# Patient Record
Sex: FEMALE | Race: WHITE | NOT HISPANIC OR LATINO | Employment: UNEMPLOYED | URBAN - METROPOLITAN AREA
[De-identification: names, ages, dates, MRNs, and addresses within clinical notes are randomized per-mention and may not be internally consistent; named-entity substitution may affect disease eponyms.]

---

## 2017-01-19 ENCOUNTER — ALLSCRIPTS OFFICE VISIT (OUTPATIENT)
Dept: OTHER | Facility: OTHER | Age: 42
End: 2017-01-19

## 2017-01-19 DIAGNOSIS — E55.9 VITAMIN D DEFICIENCY: ICD-10-CM

## 2017-01-19 DIAGNOSIS — R07.89 OTHER CHEST PAIN: ICD-10-CM

## 2017-01-19 DIAGNOSIS — D72.829 ELEVATED WHITE BLOOD CELL COUNT: ICD-10-CM

## 2017-01-19 DIAGNOSIS — D56.3 THALASSEMIA MINOR: ICD-10-CM

## 2017-01-19 DIAGNOSIS — Z13.89 ENCOUNTER FOR SCREENING FOR OTHER DISORDER: ICD-10-CM

## 2017-01-19 DIAGNOSIS — D64.9 ANEMIA: ICD-10-CM

## 2017-08-07 ENCOUNTER — ALLSCRIPTS OFFICE VISIT (OUTPATIENT)
Dept: OTHER | Facility: OTHER | Age: 42
End: 2017-08-07

## 2017-08-07 DIAGNOSIS — D56.3 THALASSEMIA MINOR: ICD-10-CM

## 2018-01-10 NOTE — PROGRESS NOTES
Assessment    1  Annual physical exam (V70 0) (Z00 00)    Plan  Alpha thalassemia minor    · (1) COMPREHENSIVE METABOLIC PANEL; Status:Active; Requested [id number]; Anemia    · (1) CBC/PLT/DIFF; Status:Active; Requested for:19Jan2017;    · (1) TSH WITH FT4 REFLEX; Status:Active; Requested [id number];   Encounter for special screening examination for genitourinary disorder    · (1) URINALYSIS (will reflex a microscopy if leukocytes, occult blood, protein or nitrites  are not within normal limits); Status:Active; Requested [id number];   Pressure in left side of chest    · (1) LIPID PANEL FASTING W DIRECT LDL REFLEX; Status:Active; Requested  [id number]:[id number];   Vitamin D deficiency    · (1) VITAMIN D 25-HYDROXY; Status:Active; Requested [id number]:[id number];     Discussion/Summary  healthy adult female cervical cancer screening is current Breast cancer screening: mammogram is current and breast cancer screening is managed by roz  Colorectal cancer screening: colorectal cancer screening is current  Screening lab work includes see orders  The immunizations are up to date  Advice and education were given regarding calcium supplements and vitamin D supplements  Chief Complaint  ANNUAL PE      History of Present Illness  HM, Adult Female: The patient is being seen for a health maintenance evaluation  General Health: The patient's health since the last visit is described as good  She has regular dental visits  She denies vision problems  She denies hearing loss  Lifestyle:  She consumes a diverse and healthy diet  She does not have any weight concerns  She does not use tobacco  She denies drug use  Reproductive health: the patient is premenopausal    Screening: cancer screening reviewed and current  Review of Systems    Constitutional: no fever, not feeling poorly, no chills and not feeling tired  Eyes: no eyesight problems     ENT: globus sensation resolved, but no earache, no nosebleeds and no sore throat  Cardiovascular: no chest pain, no palpitations and no lower extremity edema  Respiratory: no cough, no shortness of breath during exertion and no PND  Gastrointestinal: no abdominal pain, no nausea and no diarrhea  Genitourinary: no dysuria, no pelvic pain and no incontinence  Musculoskeletal: no arthralgias  Integumentary: no rashes  Neurological: no headache, no numbness, no dizziness and no difficulty walking  Psychiatric: anxiety and in under control now, but no sleep disturbances and no depression  Hematologic/Lymphatic: no tendency for easy bleeding  Active Problems    1  Abdominal pain (789 00) (R10 9)   2  Alpha thalassemia minor (282 46) (D56 3)   3  Anemia (285 9) (D64 9)   4  Annual physical exam (V70 0) (Z00 00)   5  Chronic fatigue (780 79) (R53 82)   6  Depression (311) (F32 9)   7  Diarrhea (787 91) (R19 7)   8  Dizziness (780 4) (R42)   9  Dyspnea (786 09) (R06 00)   10  Encounter for special screening examination for genitourinary disorder (V81 6) (Z13 89)   11  Globus sensation (306 4) (F45 8)   12  Laboratory examination ordered as part of a routine general medical examination    (V72 62) (Z00 00)   13  Nausea (787 02) (R11 0)   14  Palpitations (785 1) (R00 2)   15  Pressure in left side of chest (786 59) (R07 89)   16  Screening for cholesterol level (V77 91) (Z13 220)   17  Screening for diabetes mellitus (V77 1) (Z13 1)   18  Screening for thyroid disorder (V77 0) (Z13 29)   19  Trouble swallowing (787 20) (R13 10)   20   Vitamin D deficiency (268 9) (E55 9)    Past Medical History    · History of diarrhea (V12 79) (Z87 898)   · History of esophageal reflux (V12 79) (Z87 19)   · History of thalassemia (V12 3) (Z86 2)   · History of Hypochromic/Microcytic Thalassemia (282 49)    Surgical History    · History of  Section   · History of Colonoscopy    Family History  Mother    · Family history of Diabetes mellitus   · Family history of malignant neoplasm of thyroid (V16 8) (Z80 8)  Father    · Family history of Kidney cancer, primary, with metastasis from kidney to other site   · Family history of Stroke  Family History    · Family history of Diabetes Mellitus (V18 0)   · Family history of Kidney Cancer (V16 51)    Social History    · Denied: History of Alcohol Use (History)   · Denied: History of Drug Use   · Never A Smoker    Current Meds   1  Multivitamin TABS; TAKE 1 TABLET DAILY; Therapy: (Recorded:10Aug2016) to Recorded    Allergies    1  Famotidine TABS   2  Zoloft TABS    Vitals   Recorded: [id number] 09:26AM   Heart Rate 62   Systolic 94   Diastolic 68   Height 5 ft 3 in   Weight 112 lb    BMI Calculated 19 84   BSA Calculated 1 52   O2 Saturation 98     Physical Exam    Constitutional   General appearance: No acute distress, well appearing and well nourished  Head and Face   Head and face: Normal     Palpation of the face and sinuses: No sinus tenderness  Eyes   Conjunctiva and lids: No swelling, erythema or discharge  Ears, Nose, Mouth, and Throat   Otoscopic examination: Tympanic membranes translucent with normal light reflex  Canals patent without erythema  Oropharynx: Normal with no erythema, edema, exudate or lesions  Neck   Neck: Supple, symmetric, trachea midline, no masses  Thyroid: Normal, no thyromegaly  Pulmonary   Respiratory effort: No increased work of breathing or signs of respiratory distress  Auscultation of lungs: Clear to auscultation  Cardiovascular   Auscultation of heart: Normal rate and rhythm, normal S1 and S2, no murmurs  Carotid pulses: 2+ bilaterally  Abdominal aorta: Normal     Pedal pulses: 2+ bilaterally  Examination of extremities for edema and/or varicosities: Normal     Chest GYN deferred  Abdomen   Abdomen: Non-tender, no masses  Liver and spleen: No hepatomegaly or splenomegaly  Genitourinary GYN defer  Lymphatic   Palpation of lymph nodes in neck: No lymphadenopathy  Palpation of lymph nodes in axillae: No lymphadenopathy  Musculoskeletal   Gait and station: Normal     Digits and nails: Normal without clubbing or cyanosis  Joints, bones, and muscles: Normal     Range of motion: Normal     Stability: Normal     Muscle strength/tone: Normal     Skin Follows dermatology  Neurologic   Cranial nerves: Cranial nerves II-XII intact  Cortical function: Normal mental status  Reflexes: 2+ and symmetric  Sensation: No sensory loss  Coordination: Normal finger to nose and heel to shin  Psychiatric   Judgment and insight: Normal     Orientation to person, place, and time: Normal     Recent and remote memory: Intact  Mood and affect: Abnormal   Anxious, tearful, but not suicidal       Results/Data  PHQ-9 Adult Depression Screening [id number] 10:09AM User, s     Test Name Result Flag Reference   PHQ-9 Adult Depression Score 3     Over the last two weeks, how often have you been bothered by any of the following problems? Little interest or pleasure in doing things: Several days - 1  Feeling down, depressed, or hopeless: Not at all - 0  Trouble falling or staying asleep, or sleeping too much: Not at all - 0  Feeling tired or having little energy: Several days - 1  Poor appetite or over eating: Not at all - 0  Feeling bad about yourself - or that you are a failure or have let yourself or your family down: Not at all - 0  Trouble concentrating on things, such as reading the newspaper or watching television: Several days - 1  Moving or speaking so slowly that other people could have noticed   Or the opposite -  being so fidgety or restless that you have been moving around a lot more than usual: Not at all - 0  Thoughts that you would be better off dead, or of hurting yourself in some way: Not at all - 0   PHQ-9 Adult Depression Screening Negative     PHQ-9 Difficulty Level Not difficult at all     PHQ-9 Severity Minimal Depression         Future Appointments    Date/Time Provider Specialty Site   01/23/2018 09:20 AM CALDERON Capps   Internal Medicine [facility] IM     Signatures   Electronically signed by : CALDERON Quezada ; Jan 19 2017 10:54AM EST                       (Author)

## 2018-01-14 VITALS
DIASTOLIC BLOOD PRESSURE: 68 MMHG | HEART RATE: 62 BPM | WEIGHT: 112 LBS | SYSTOLIC BLOOD PRESSURE: 94 MMHG | OXYGEN SATURATION: 98 % | BODY MASS INDEX: 19.84 KG/M2 | HEIGHT: 63 IN

## 2018-01-14 VITALS
SYSTOLIC BLOOD PRESSURE: 110 MMHG | HEART RATE: 65 BPM | WEIGHT: 113.13 LBS | HEIGHT: 63 IN | OXYGEN SATURATION: 98 % | DIASTOLIC BLOOD PRESSURE: 66 MMHG | BODY MASS INDEX: 20.04 KG/M2

## 2018-01-23 ENCOUNTER — ALLSCRIPTS OFFICE VISIT (OUTPATIENT)
Dept: OTHER | Facility: OTHER | Age: 43
End: 2018-01-23

## 2018-01-23 DIAGNOSIS — R53.82 CHRONIC FATIGUE: ICD-10-CM

## 2018-01-23 DIAGNOSIS — Z13.29 ENCOUNTER FOR SCREENING FOR OTHER SUSPECTED ENDOCRINE DISORDER: ICD-10-CM

## 2018-01-23 DIAGNOSIS — E55.9 VITAMIN D DEFICIENCY: ICD-10-CM

## 2018-01-23 DIAGNOSIS — F32.9 MAJOR DEPRESSIVE DISORDER, SINGLE EPISODE: ICD-10-CM

## 2018-01-23 DIAGNOSIS — D56.3 THALASSEMIA MINOR: ICD-10-CM

## 2018-01-23 DIAGNOSIS — Z13.220 ENCOUNTER FOR SCREENING FOR LIPOID DISORDERS: ICD-10-CM

## 2018-01-23 DIAGNOSIS — Z87.19 PERSONAL HISTORY OF OTHER DISEASES OF THE DIGESTIVE SYSTEM (CODE): ICD-10-CM

## 2018-01-24 NOTE — PROGRESS NOTES
Assessment    1  Vitamin D deficiency (268 9) (E55 9)   2  Screening for cholesterol level (V77 91) (Z13 220)   3  Special screening examination for diabetes mellitus (V77 1) (Z13 1)   4  Screening for thyroid disorder (V77 0) (Z13 29)   5  Annual physical exam (V70 0) (Z00 00)   6  Alpha thalassemia minor (282 46) (D56 3)    Plan  Alpha thalassemia minor, Chronic fatigue, PMH: History of depression, PMH: History of  dysphagia    · (1) CBC/PLT/DIFF; Status:Active; Requested [id number]:[id number];   PMH: History of dysphagia    · (1) COMPREHENSIVE METABOLIC PANEL; Status:Active; Requested [id number]:[id number];    · (1) URINALYSIS (will reflex a microscopy if leukocytes, occult blood, protein or nitrites  are not within normal limits); Status:Active; Requested [id number];   PMH: History of dysphagia, Vitamin D deficiency    · (1) VITAMIN D 25-HYDROXY; Status:Active; Requested [id number]:[id number];   Screening for cholesterol level, Screening for thyroid disorder    · (1) LIPID PANEL FASTING W DIRECT LDL REFLEX; Status:Active; Requested  MCE:[id number];    · (1) TSH WITH FT4 REFLEX; Status:Active; Requested [id number]; Discussion/Summary  healthy adult female cervical cancer screening is needed every three years PER GYN Breast cancer screening: PER GYN  Colorectal cancer screening: colorectal cancer screening is current  Osteoporosis screening: bone mineral density testing is not indicated  Screening lab work includes hemoglobin, glucose, lipid profile, thyroid function testing, 25-hydroxyvitamin D and urinalysis  The patient declines immunizations  Advice and education were given regarding calcium supplements and vitamin D supplements  Patient discussion: discussed with the patient, ent REFERAL FOR CH EAR PRESSURE  Chief Complaint  ANNUAL PE      History of Present Illness  HM, Adult Female: The patient is being seen for a health maintenance evaluation  General Health:  The patient's health since the last visit is described as good  She has regular dental visits  She denies vision problems  She denies hearing loss  Lifestyle:  She consumes a diverse and healthy diet  She does not have any weight concerns  She does not use tobacco  She denies drug use  Reproductive health:  she reports normal menses  she is sexually active  Screening:      Review of Systems    Constitutional: feeling tired, but no fever, not feeling poorly, no recent weight gain, no chills and no recent weight loss  Eyes: no eyesight problems and no dryness of the eyes  ENT: earache, nasal discharge and PND, but no nosebleeds, no sore throat and no hoarseness  Cardiovascular: no chest pain, no palpitations and no lower extremity edema  Respiratory: no shortness of breath, no cough and no shortness of breath during exertion  Gastrointestinal: no abdominal pain, no nausea, no diarrhea and no blood in stools  Genitourinary: no dysuria  Musculoskeletal: no arthralgias and no joint swelling  Neurological: no numbness, no tingling and no dizziness  Psychiatric: no anxiety and no depression  Endocrine: no muscle weakness and no deepening of the voice  Hematologic/Lymphatic: no tendency for easy bleeding  Active Problems    1  Alpha thalassemia minor (282 46) (D56 3)   2  Annual physical exam (V70 0) (Z00 00)   3  Chronic fatigue (780 79) (R53 82)   4  Encounter for special screening examination for genitourinary disorder (V81 6) (Z13 89)   5  Laboratory examination ordered as part of a routine general medical examination   (V72 62) (Z00 00)   6  Screening for cholesterol level (V77 91) (Z13 220)   7  Screening for thyroid disorder (V77 0) (Z13 29)   8  Special screening examination for diabetes mellitus (V77 1) (Z13 1)   9   Vitamin D deficiency (268 9) (E55 9)    Past Medical History    · History of chronic fatigue (V13 89) (J07 463)   · History of depression (V11 8) (Z86 59)   · History of diarrhea (V12 79) (L87 791)   · History of esophageal reflux (V12 79) (Z87 19)   · History of leukocytosis (V12 3) (Z86 2)   · History of palpitations (V12 59) (Z87 898)   · History of thalassemia (V12 3) (Z86 2)   · History of Hypochromic/Microcytic Thalassemia (282 49)   · History of Pressure in left side of chest (786 59) (R07 89)    Surgical History    · History of  Section   · History of Colonoscopy    Family History  Mother    · Family history of Diabetes mellitus   · Family history of malignant neoplasm of thyroid (V16 8) (Z80 8)  Father    · Family history of Kidney cancer, primary, with metastasis from kidney to other site   · Family history of Stroke  Family History    · Family history of Diabetes Mellitus (V18 0)   · Family history of Kidney Cancer (V16 51)    Social History    · Denied: History of Alcohol Use (History)   · Denied: History of Drug Use   · Never A Smoker    Current Meds   1  Multivitamin TABS; TAKE 1 TABLET DAILY; Therapy: (Recorded:[id number]) to Recorded    Allergies    1  Famotidine TABS   2  Zoloft TABS    Vitals   Recorded: [id number] 09:20AM   Heart Rate 73   Systolic 96   Diastolic 68   Height 5 ft 3 in   Weight 113 lb    BMI Calculated 20 02   BSA Calculated 1 52   O2 Saturation 98     Physical Exam    Constitutional   General appearance: No acute distress, well appearing and well nourished  Head and Face   Head and face: Normal     Palpation of the face and sinuses: No sinus tenderness  Eyes   Conjunctiva and lids: No swelling, erythema or discharge  Ears, Nose, Mouth, and Throat   External inspection of ears and nose: Normal     Otoscopic examination: Tympanic membranes translucent with normal light reflex  Canals patent without erythema  Hearing: Normal     Oropharynx: Normal with no erythema, edema, exudate or lesions  Neck   Neck: Supple, symmetric, trachea midline, no masses  Thyroid: Normal, no thyromegaly      Pulmonary   Respiratory effort: No increased work of breathing or signs of respiratory distress  Auscultation of lungs: Clear to auscultation  Cardiovascular   Auscultation of heart: Normal rate and rhythm, normal S1 and S2, no murmurs  Carotid pulses: 2+ bilaterally  Examination of extremities for edema and/or varicosities: Normal     Abdomen   Abdomen: Non-tender, no masses  Liver and spleen: No hepatomegaly or splenomegaly  Lymphatic   Palpation of lymph nodes in neck: No lymphadenopathy  Palpation of lymph nodes in axillae: No lymphadenopathy  Musculoskeletal   Gait and station: Normal     Digits and nails: Normal without clubbing or cyanosis  Neurologic   Cranial nerves: Cranial nerves II-XII intact  Cortical function: Normal mental status  Coordination: Normal finger to nose and heel to shin  Psychiatric   Judgment and insight: Normal     Orientation to person, place, and time: Normal     Recent and remote memory: Intact  Mood and affect: Normal        Results/Data  PHQ-9 Adult Depression Screening 23Jan2018 10:21AM User, [facility]     Test Name Result Flag Reference   PHQ-9 Adult Depression Score 4     Over the last two weeks, how often have you been bothered by any of the following problems? Little interest or pleasure in doing things: Several days - 1  Feeling down, depressed, or hopeless: Several days - 1  Trouble falling or staying asleep, or sleeping too much: Not at all - 0  Feeling tired or having little energy: Several days - 1  Poor appetite or over eating: Not at all - 0  Feeling bad about yourself - or that you are a failure or have let yourself or your family down: Not at all - 0  Trouble concentrating on things, such as reading the newspaper or watching television: Several days - 1  Moving or speaking so slowly that other people could have noticed   Or the opposite -  being so fidgety or restless that you have been moving around a lot more than usual: Not at all - 0  Thoughts that you would be better off dead, or of hurting yourself in some way: Not at all - 0   PHQ-9 Adult Depression Screening Negative     PHQ-9 Difficulty Level Somewhat difficult     PHQ-9 Severity Minimal Depression         Signatures   Electronically signed by : Kal [place], ; Jan 23 2018  6:28PM EST                       (Author)    Electronically signed by : CALDERON Benitez ; Jan 24 2018  3:17PM EST                       (Author)

## 2018-02-09 ENCOUNTER — TELEPHONE (OUTPATIENT)
Dept: INTERNAL MEDICINE CLINIC | Facility: CLINIC | Age: 43
End: 2018-02-09

## 2018-02-12 NOTE — TELEPHONE ENCOUNTER
I just placed a copy in your folder  Pt would like to know when she should do a repeat u/s of thyroid

## 2019-01-24 ENCOUNTER — OFFICE VISIT (OUTPATIENT)
Dept: FAMILY MEDICINE CLINIC | Facility: CLINIC | Age: 44
End: 2019-01-24
Payer: COMMERCIAL

## 2019-01-24 VITALS
BODY MASS INDEX: 21.53 KG/M2 | DIASTOLIC BLOOD PRESSURE: 66 MMHG | OXYGEN SATURATION: 99 % | TEMPERATURE: 99.1 F | HEIGHT: 62 IN | SYSTOLIC BLOOD PRESSURE: 110 MMHG | HEART RATE: 60 BPM | WEIGHT: 117 LBS

## 2019-01-24 DIAGNOSIS — Z00.00 ANNUAL PHYSICAL EXAM: ICD-10-CM

## 2019-01-24 DIAGNOSIS — Z13.89 SCREENING FOR GENITOURINARY CONDITION: ICD-10-CM

## 2019-01-24 DIAGNOSIS — Z23 NEED FOR INFLUENZA VACCINATION: Primary | ICD-10-CM

## 2019-01-24 DIAGNOSIS — Z23 NEED FOR PNEUMOCOCCAL VACCINE: ICD-10-CM

## 2019-01-24 DIAGNOSIS — Z13.1 SCREENING FOR DIABETES MELLITUS: ICD-10-CM

## 2019-01-24 DIAGNOSIS — N92.6 MENSTRUAL IRREGULARITY: ICD-10-CM

## 2019-01-24 DIAGNOSIS — Z13.220 SCREENING FOR LIPID DISORDERS: ICD-10-CM

## 2019-01-24 DIAGNOSIS — Z80.8 FAMILY HISTORY OF THYROID CANCER: ICD-10-CM

## 2019-01-24 DIAGNOSIS — Z13.21 ENCOUNTER FOR VITAMIN DEFICIENCY SCREENING: ICD-10-CM

## 2019-01-24 DIAGNOSIS — Z13.29 SCREENING FOR THYROID DISORDER: ICD-10-CM

## 2019-01-24 DIAGNOSIS — Z23 NEED FOR TDAP VACCINATION: ICD-10-CM

## 2019-01-24 PROCEDURE — 99396 PREV VISIT EST AGE 40-64: CPT | Performed by: INTERNAL MEDICINE

## 2019-01-24 NOTE — PROGRESS NOTES
Assessment/Plan:         Diagnoses and all orders for this visit:    Need for influenza vaccination  -     Cancel: PREFERRED: influenza vaccine, [id number], quadrivalent, recombinant, PF, 0 5 mL (FLUBLOK)    Annual physical exam    Need for pneumococcal vaccine  -     Cancel: PNEUMOCOCCAL POLYSACCHARIDE VACCINE 23-VALENT =>1YO SQ IM    Need for Tdap vaccination  -     Cancel: TDAP VACCINE GREATER THAN OR EQUAL TO 8YO IM    Screening for lipid disorders    Screening for thyroid disorder  -     CBC and differential  -     Comprehensive metabolic panel  -     TSH, 3rd generation with Free T4 reflex    Screening for diabetes mellitus  -     Lipid panel    Encounter for vitamin deficiency screening  -     Vitamin D 25 hydroxy    Screening for genitourinary condition  -     Urinalysis with microscopic    Menstrual irregularity  -     Insulin, random  -     Testosterone, Free and Weakly Bound  -     DHEA-sulfate  -     17-Hydroxyprogesterone    Family history of thyroid cancer  -     US thyroid; Future        Subjective:      Patient ID: Den Ambrose is a 44 y o  female  Patient is here for annual physical   She is had been following up regularly with a gynecologist   Has a mammogram coming up soon  She has been having menstrual irregularity  Has been feeling somewhat begum easily irritated  She does not report any heat flashes  I advised her to follow up her gyn  She has been having some acne facial hair as well  Has not gained any weight  She is due for lab studies  See ROS        The following portions of the patient's history were reviewed and updated as appropriate: allergies, current medications, past family history, past medical history, past social history, past surgical history and problem list     Review of Systems   Constitutional: Negative for appetite change, chills, fatigue, fever and unexpected weight change     HENT: Negative for congestion, hearing loss, postnasal drip, trouble swallowing and voice change  Eyes: Negative for pain and visual disturbance  Respiratory: Negative for cough, chest tightness and shortness of breath  Cardiovascular: Negative for chest pain, palpitations and leg swelling  Gastrointestinal: Negative for abdominal pain, blood in stool, constipation, diarrhea, nausea and vomiting  Endocrine: Negative for cold intolerance, heat intolerance, polydipsia and polyphagia  Genitourinary: Positive for menstrual problem  Negative for difficulty urinating, flank pain, frequency and hematuria  Musculoskeletal: Negative for arthralgias, back pain, gait problem, joint swelling and myalgias  Skin: Negative for rash  Neurological: Negative for dizziness, weakness, light-headedness, numbness and headaches  Hematological: Negative for adenopathy  Does not bruise/bleed easily  Psychiatric/Behavioral: Negative for confusion, dysphoric mood and sleep disturbance  The patient is nervous/anxious  Objective:      /66 (BP Location: Left arm, Patient Position: Sitting, Cuff Size: Standard)   Pulse 60   Temp 99 1 °F (37 3 °C) (Tympanic)   Ht 5' 2" (1 575 m)   Wt 53 1 kg (117 lb)   SpO2 99%   BMI 21 40 kg/m²          Physical Exam   Constitutional: She is oriented to person, place, and time  She appears well-developed and well-nourished  No distress  HENT:   Head: Normocephalic  Mouth/Throat: No oropharyngeal exudate  Eyes: Pupils are equal, round, and reactive to light  No scleral icterus  Neck: No thyromegaly present  Cardiovascular: Normal rate, regular rhythm, normal heart sounds and intact distal pulses  No murmur heard  Pulmonary/Chest: Effort normal and breath sounds normal  No respiratory distress  She has no wheezes  She has no rales  Abdominal: Soft  Bowel sounds are normal  She exhibits no distension  There is no tenderness  There is no rebound  Musculoskeletal: She exhibits no edema     Lymphadenopathy:     She has no cervical adenopathy  Neurological: She is alert and oriented to person, place, and time  No cranial nerve deficit  Skin: Skin is warm  Psychiatric: She has a normal mood and affect   Her behavior is normal  Judgment and thought content normal

## 2019-02-25 ENCOUNTER — TELEPHONE (OUTPATIENT)
Dept: FAMILY MEDICINE CLINIC | Facility: CLINIC | Age: 44
End: 2019-02-25

## 2019-02-25 DIAGNOSIS — E55.9 VITAMIN D DEFICIENCY: Primary | ICD-10-CM

## 2019-02-25 RX ORDER — ERGOCALCIFEROL 1.25 MG/1
50000 CAPSULE ORAL WEEKLY
Qty: 8 CAPSULE | Refills: 0 | Status: SHIPPED | OUTPATIENT
Start: 2019-02-25 | End: 2019-10-10 | Stop reason: ALTCHOICE

## 2019-02-28 ENCOUNTER — TELEPHONE (OUTPATIENT)
Dept: FAMILY MEDICINE CLINIC | Facility: CLINIC | Age: 44
End: 2019-02-28

## 2019-03-28 ENCOUNTER — HOSPITAL ENCOUNTER (OUTPATIENT)
Dept: ULTRASOUND IMAGING | Facility: MEDICAL CENTER | Age: 44
Discharge: HOME | End: 2019-03-28
Payer: COMMERCIAL

## 2019-03-28 DIAGNOSIS — Z80.8 FAMILY HISTORY OF THYROID CANCER: ICD-10-CM

## 2019-03-28 PROCEDURE — 76536 US EXAM OF HEAD AND NECK: CPT

## 2019-03-29 ENCOUNTER — OFFICE VISIT (OUTPATIENT)
Dept: FAMILY MEDICINE CLINIC | Facility: CLINIC | Age: 44
End: 2019-03-29
Payer: COMMERCIAL

## 2019-03-29 VITALS
BODY MASS INDEX: 20.8 KG/M2 | SYSTOLIC BLOOD PRESSURE: 116 MMHG | WEIGHT: 113 LBS | DIASTOLIC BLOOD PRESSURE: 82 MMHG | HEIGHT: 62 IN | OXYGEN SATURATION: 99 % | HEART RATE: 71 BPM

## 2019-03-29 DIAGNOSIS — R07.82 INTERCOSTAL PAIN: Primary | ICD-10-CM

## 2019-03-29 DIAGNOSIS — R00.2 PALPITATION: ICD-10-CM

## 2019-03-29 PROCEDURE — 1036F TOBACCO NON-USER: CPT | Performed by: INTERNAL MEDICINE

## 2019-03-29 PROCEDURE — 3008F BODY MASS INDEX DOCD: CPT | Performed by: INTERNAL MEDICINE

## 2019-03-29 PROCEDURE — 99214 OFFICE O/P EST MOD 30 MIN: CPT | Performed by: INTERNAL MEDICINE

## 2019-03-29 NOTE — PROGRESS NOTES
Assessment/Plan:         Diagnoses and all orders for this visit:    Intercostal pain   musculoskeletal in nature  Mild scoliosis  Some lifestyle changes discussed with the patient  Palpitation    EKG in the office was unremarkable  Patient reassured  Reactive stress  Subjective:      Patient ID: Shantanu Sparks is a 44 y o  female  HPI  Patient is here with complaining of palpitation left-sided chest discomfort  This is been going on off and on for several weeks now  She has been having more palpitations symptoms associated with shortness of breath but no dizziness diaphoresis  She had cardiac workup a couple of years without was unremarkable  Patient has been a lot of stress lately  She also has been having left side discomfort that starts in the middle of the back and rotate around to the front  Denies any rash  The following portions of the patient's history were reviewed and updated as appropriate: allergies, current medications, past medical history, past social history and problem list     Review of Systems   Constitutional: Negative for chills and fever  Respiratory: Negative for cough and shortness of breath  Cardiovascular: Positive for chest pain  Gastrointestinal: Negative for abdominal pain  Musculoskeletal: Positive for back pain  Neurological: Negative for dizziness, numbness and headaches  Objective:      /82 (BP Location: Left arm, Patient Position: Sitting, Cuff Size: Standard)   Pulse 71   Ht 5' 2" (1 575 m)   Wt 51 3 kg (113 lb)   SpO2 99%   BMI 20 67 kg/m²          Physical Exam   HENT:   Mouth/Throat: Oropharynx is clear and moist    Cardiovascular: Normal rate, regular rhythm and normal heart sounds  No murmur heard  Pulmonary/Chest: Effort normal and breath sounds normal  No stridor  No respiratory distress  She has no wheezes  She exhibits tenderness  Musculoskeletal:   Mild thoracic scoliosis   Neurological: She is alert     Skin: No rash () noted    Psychiatric:   Anxious about concerns at home

## 2019-04-01 ENCOUNTER — TELEPHONE (OUTPATIENT)
Dept: FAMILY MEDICINE CLINIC | Facility: CLINIC | Age: 44
End: 2019-04-01

## 2019-10-10 ENCOUNTER — OFFICE VISIT (OUTPATIENT)
Dept: FAMILY MEDICINE CLINIC | Facility: CLINIC | Age: 44
End: 2019-10-10
Payer: COMMERCIAL

## 2019-10-10 VITALS
WEIGHT: 112 LBS | SYSTOLIC BLOOD PRESSURE: 116 MMHG | HEIGHT: 62 IN | BODY MASS INDEX: 20.61 KG/M2 | DIASTOLIC BLOOD PRESSURE: 68 MMHG

## 2019-10-10 DIAGNOSIS — E55.9 VITAMIN D DEFICIENCY: Primary | ICD-10-CM

## 2019-10-10 DIAGNOSIS — Z13.1 SCREENING FOR DIABETES MELLITUS: ICD-10-CM

## 2019-10-10 DIAGNOSIS — Z13.89 SCREENING FOR GENITOURINARY CONDITION: ICD-10-CM

## 2019-10-10 DIAGNOSIS — Z13.29 SCREENING FOR THYROID DISORDER: ICD-10-CM

## 2019-10-10 DIAGNOSIS — I83.93 SPIDER VEINS OF BOTH LOWER EXTREMITIES: ICD-10-CM

## 2019-10-10 DIAGNOSIS — Z13.220 NEED FOR LIPID SCREENING: ICD-10-CM

## 2019-10-10 PROCEDURE — 99213 OFFICE O/P EST LOW 20 MIN: CPT | Performed by: INTERNAL MEDICINE

## 2019-10-10 PROCEDURE — 3008F BODY MASS INDEX DOCD: CPT | Performed by: INTERNAL MEDICINE

## 2019-10-10 RX ORDER — MELATONIN
1000 DAILY
COMMUNITY

## 2019-10-10 NOTE — PROGRESS NOTES
Assessment/Plan:         Diagnoses and all orders for this visit:  Spider veins of both lower extremities  Patient reassured  She could wear compression stockings try to keep her legs elevated and frequent movement of the legs if on a long trip    Vitamin D deficiency  -     Vitamin D 25 hydroxy    Screening for diabetes mellitus  -     Comprehensive metabolic panel    Screening for thyroid disorder  -     TSH, 3rd generation with Free T4 reflex    Screening for genitourinary condition  -     CBC and differential  -     Urinalysis with microscopic    Need for lipid screening  -     Lipid Panel with Direct LDL reflex    Other orders  -     cholecalciferol (VITAMIN D3) 1,000 units tablet; Take 1,000 Units by mouth daily        Subjective:      Patient ID: Latricia Jean is a 38 y o  female  HPI  Patient is here for an acute visit concerned about feeling of flushing that is experiencing bilateral lower extremity especially she lays in her bed  Been going on for several months now  Denies any edema lower extremity but has been noticing more spider veins in the lower extremity  She denies any cramps in the legs  Recently her mother had a concern for DVT and she was advised to follow up with me  Patient denies any calf tenderness chest pain shortness of breath  Denies any hemorrhoidal issues  She is not on birth control  She has not gained weight  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  The following portions of the patient's history were reviewed and updated as appropriate: allergies, current medications, past medical history, past social history, past surgical history and problem list       Review of Systems   Respiratory: Negative for cough and shortness of breath  Cardiovascular: Negative for chest pain and leg swelling  Gastrointestinal: Negative for blood in stool and rectal pain         As above  Objective:      /68 (BP Location: Left arm, Patient Position: Sitting, Cuff Size: Standard)   Ht 5' 2" (1 575 m)   Wt 50 8 kg (112 lb)   BMI 20 49 kg/m²          Physical Exam   Cardiovascular: Intact distal pulses  Musculoskeletal: She exhibits no edema     Skin:   Superficial spider veins no edema negative calf tenderness negative Homans

## 2020-02-20 ENCOUNTER — OFFICE VISIT (OUTPATIENT)
Dept: FAMILY MEDICINE CLINIC | Facility: CLINIC | Age: 45
End: 2020-02-20
Payer: COMMERCIAL

## 2020-02-20 VITALS
SYSTOLIC BLOOD PRESSURE: 90 MMHG | BODY MASS INDEX: 21.53 KG/M2 | HEIGHT: 62 IN | OXYGEN SATURATION: 96 % | HEART RATE: 70 BPM | TEMPERATURE: 96.6 F | WEIGHT: 117 LBS | DIASTOLIC BLOOD PRESSURE: 62 MMHG

## 2020-02-20 DIAGNOSIS — Z00.00 ANNUAL PHYSICAL EXAM: Primary | ICD-10-CM

## 2020-02-20 DIAGNOSIS — R00.2 PALPITATION: ICD-10-CM

## 2020-02-20 PROCEDURE — 3008F BODY MASS INDEX DOCD: CPT | Performed by: INTERNAL MEDICINE

## 2020-02-20 PROCEDURE — 99396 PREV VISIT EST AGE 40-64: CPT | Performed by: INTERNAL MEDICINE

## 2020-02-20 NOTE — PROGRESS NOTES
Assessment/Plan:         Diagnoses and all orders for this visit:  Annual physical exam   up-to-date with flu vaccine  Patient also had DTaP 11 years ago  Palpitation  -     Ambulatory referral to Cardiology; Future    See discussion above  Other orders  -     Cancel: Mammo screening bilateral w 3d & cad; Future        Subjective:      Patient ID: Jose Martin Almeida is a 38 y o  female  HPI    Patient is here for annual physical   Had concerns about not having a Pap smear every year  I encouraged her to discuss this further with her gynecologist   She is due for mammogram which will be done in a month  She has several questions about HPV vaccination Gardasil for her children which I answered to the best of my abilities and encouraged her to discuss this further with her  Pediatrician  She reports she still gets palpitations  EKG done last time she brought this up  3/29/19 was unremarkable  These are intermittent episodes short-lived not associated with shortness of breath dizziness pre syncopy etc  She  carlyle admit she gets anxious and gets a globus sensation in her throat  She also has ongoing left side chest discomfort on palpation which does radiate to the back  This is been ascertained to be musculoskeletal in nature  I did encourage her to get a chest x-ray patient does not want to do that  I would refer her to see Cardiology to better investigate palpitations  I also reassured her thyroid ultrasound done last year was normal   The following portions of the patient's history were reviewed and updated as appropriate: allergies, current medications, past family history, past medical history, past social history, past surgical history and problem list     Review of Systems   Constitutional: Negative for appetite change, chills, fatigue, fever and unexpected weight change  HENT: Negative for congestion, hearing loss, postnasal drip, trouble swallowing and voice change      Eyes: Negative for pain and visual disturbance  Respiratory: Positive for chest tightness  Negative for cough and shortness of breath  Cardiovascular: Positive for palpitations  Negative for chest pain and leg swelling  Gastrointestinal: Negative for abdominal pain, blood in stool, constipation, diarrhea, nausea and vomiting  Endocrine: Negative for cold intolerance, heat intolerance, polydipsia and polyphagia  Genitourinary: Negative for difficulty urinating, flank pain, frequency and hematuria  Musculoskeletal: Negative for arthralgias, back pain, gait problem, joint swelling and myalgias  Skin: Negative for rash  Neurological: Negative for dizziness, weakness, light-headedness, numbness and headaches  Hematological: Negative for adenopathy  Does not bruise/bleed easily  Psychiatric/Behavioral: Negative for confusion, dysphoric mood and sleep disturbance  Objective:      BP 90/62 (BP Location: Right arm, Patient Position: Sitting, Cuff Size: Standard)   Pulse 70   Temp (!) 96 6 °F (35 9 °C)   Ht 5' 2" (1 575 m)   Wt 53 1 kg (117 lb)   SpO2 96%   BMI 21 40 kg/m²          Physical Exam   Constitutional: She is oriented to person, place, and time  She appears well-developed and well-nourished  No distress  HENT:   Mouth/Throat: Oropharynx is clear and moist  No oropharyngeal exudate  Eyes: Conjunctivae are normal  No scleral icterus  Neck: No thyromegaly present  Cardiovascular: Normal rate, regular rhythm, normal heart sounds and intact distal pulses  No murmur heard  Pulmonary/Chest: Effort normal and breath sounds normal  No respiratory distress  She has no wheezes  She has no rales  She exhibits tenderness ( left chest wall tenderness on palpation  Patient reports it radiates to the back as well)  Abdominal: Soft  Bowel sounds are normal  She exhibits no distension and no mass  There is no tenderness  There is no rebound and no guarding  Musculoskeletal: She exhibits no edema  Lymphadenopathy:     She has no cervical adenopathy  Neurological: She is alert and oriented to person, place, and time  She has normal reflexes  No cranial nerve deficit  Skin: Skin is warm  Psychiatric: She has a normal mood and affect   Her behavior is normal  Judgment and thought content normal

## 2021-03-08 ENCOUNTER — OFFICE VISIT (OUTPATIENT)
Dept: FAMILY MEDICINE CLINIC | Facility: CLINIC | Age: 46
End: 2021-03-08
Payer: COMMERCIAL

## 2021-03-08 VITALS
SYSTOLIC BLOOD PRESSURE: 100 MMHG | WEIGHT: 119.4 LBS | DIASTOLIC BLOOD PRESSURE: 60 MMHG | HEIGHT: 62 IN | BODY MASS INDEX: 21.97 KG/M2 | TEMPERATURE: 97.6 F

## 2021-03-08 DIAGNOSIS — Z13.220 NEED FOR LIPID SCREENING: ICD-10-CM

## 2021-03-08 DIAGNOSIS — D56.3 ALPHA THALASSEMIA MINOR: ICD-10-CM

## 2021-03-08 DIAGNOSIS — E55.9 VITAMIN D DEFICIENCY: ICD-10-CM

## 2021-03-08 DIAGNOSIS — Z13.29 SCREENING FOR THYROID DISORDER: ICD-10-CM

## 2021-03-08 DIAGNOSIS — Z13.1 SCREENING FOR DIABETES MELLITUS: ICD-10-CM

## 2021-03-08 DIAGNOSIS — Z13.89 SCREENING FOR GENITOURINARY CONDITION: ICD-10-CM

## 2021-03-08 DIAGNOSIS — Z00.00 ANNUAL PHYSICAL EXAM: Primary | ICD-10-CM

## 2021-03-08 PROCEDURE — 1036F TOBACCO NON-USER: CPT | Performed by: INTERNAL MEDICINE

## 2021-03-08 PROCEDURE — 99396 PREV VISIT EST AGE 40-64: CPT | Performed by: INTERNAL MEDICINE

## 2021-03-08 PROCEDURE — 3008F BODY MASS INDEX DOCD: CPT | Performed by: INTERNAL MEDICINE

## 2021-03-08 PROCEDURE — 3725F SCREEN DEPRESSION PERFORMED: CPT | Performed by: INTERNAL MEDICINE

## 2021-03-08 NOTE — PROGRESS NOTES
Assessment/Plan:         Diagnoses and all orders for this visit:    Annual physical exam    Patient will follow-up with cardiology as discussed above  Her referral will be printed out for her again  Encouraged her to use over-the-counter antacid treatments to help with dyspepsia  She had an EGD and colonoscopy as mentioned above  Thyroid ultrasound had been unremarkable  Patient reassured  Alpha thalassemia minor  -     CBC and differential  -     Comprehensive metabolic panel    Vitamin D deficiency  -     Vitamin D 25 hydroxy    Screening for thyroid disorder  -     TSH, 3rd generation with Free T4 reflex    Screening for diabetes mellitus  -     Comprehensive metabolic panel    Need for lipid screening  -     Lipid panel    Screening for genitourinary condition  -     Urinalysis with microscopic        Subjective:      Patient ID: Aminata Tipton is a 41 y o  female  HPI    Patient is here for annual physical     Patient had thyroid ultrasound 2 years ago with family history of thyroid cancer which was unremarkable  She has ongoing chest tightness  EGD and colonoscopy 4 years ago did not show anything concerning  Patient does report increased gassiness in her stomach  Encouraged her use of Maalox or BEENO etc over-the-counter to help with discomfort  She had been referred to cardiology because of ongoing sensation of chest tightness  She is due for lab studies  The following portions of the patient's history were reviewed and updated as appropriate: allergies, current medications, past family history, past medical history, past social history, past surgical history and problem list     Review of Systems   Constitutional: Negative for appetite change, chills, fatigue, fever and unexpected weight change  HENT: Negative for congestion, hearing loss, postnasal drip, trouble swallowing and voice change  Eyes: Positive for visual disturbance (Patient Will have her vision checked)   Negative for pain    Respiratory: Positive for chest tightness  Negative for cough and shortness of breath  Cardiovascular: Positive for palpitations  Negative for chest pain and leg swelling  Gastrointestinal: Negative for abdominal pain, blood in stool, constipation, diarrhea, nausea and vomiting  Increase "gassiness"   Endocrine: Negative for cold intolerance, heat intolerance, polydipsia and polyphagia  Genitourinary: Negative for difficulty urinating, flank pain, frequency and hematuria  Musculoskeletal: Negative for arthralgias, back pain, gait problem, joint swelling and myalgias  Skin: Negative for rash  Neurological: Negative for dizziness, weakness, light-headedness, numbness and headaches  Hematological: Negative for adenopathy  Does not bruise/bleed easily  Psychiatric/Behavioral: Negative for confusion, dysphoric mood and sleep disturbance  Objective:      /60 (BP Location: Right arm, Patient Position: Sitting, Cuff Size: Adult)   Temp 97 6 °F (36 4 °C) (Tympanic)   Ht 5' 2" (1 575 m)   Wt 54 2 kg (119 lb 6 4 oz)   BMI 21 84 kg/m²          Physical Exam  Constitutional:       General: She is not in acute distress  Appearance: She is well-developed  HENT:      Head: Normocephalic  Mouth/Throat:      Pharynx: No oropharyngeal exudate  Eyes:      General: No scleral icterus  Pupils: Pupils are equal, round, and reactive to light  Neck:      Thyroid: No thyromegaly  Cardiovascular:      Rate and Rhythm: Normal rate and regular rhythm  Heart sounds: Normal heart sounds  No murmur  Pulmonary:      Effort: Pulmonary effort is normal  No respiratory distress  Breath sounds: Normal breath sounds  No wheezing or rales  Abdominal:      General: Bowel sounds are normal  There is no distension  Palpations: Abdomen is soft  There is no mass  Tenderness: There is no abdominal tenderness  There is no guarding or rebound        Hernia: No hernia is present  Lymphadenopathy:      Cervical: No cervical adenopathy  Skin:     General: Skin is warm  Neurological:      Mental Status: She is alert and oriented to person, place, and time  Cranial Nerves: No cranial nerve deficit  Deep Tendon Reflexes: Reflexes are normal and symmetric  Psychiatric:         Mood and Affect: Mood normal          Behavior: Behavior normal          Thought Content: Thought content normal          Judgment: Judgment normal                Depression Screening and Follow-up Plan: Patient's depression screening was positive with a PHQ-2 score of 0  Clincally patient does not have depression  No treatment is required

## 2021-04-20 ENCOUNTER — CONSULT (OUTPATIENT)
Dept: CARDIOLOGY CLINIC | Facility: CLINIC | Age: 46
End: 2021-04-20
Payer: COMMERCIAL

## 2021-04-20 VITALS
HEIGHT: 62 IN | BODY MASS INDEX: 21.75 KG/M2 | SYSTOLIC BLOOD PRESSURE: 116 MMHG | DIASTOLIC BLOOD PRESSURE: 60 MMHG | WEIGHT: 118.2 LBS | HEART RATE: 60 BPM

## 2021-04-20 DIAGNOSIS — M79.605 PAIN IN BOTH LOWER EXTREMITIES: ICD-10-CM

## 2021-04-20 DIAGNOSIS — M79.604 PAIN IN BOTH LOWER EXTREMITIES: ICD-10-CM

## 2021-04-20 DIAGNOSIS — R00.2 PALPITATIONS: Primary | ICD-10-CM

## 2021-04-20 DIAGNOSIS — I25.10 ASCVD (ARTERIOSCLEROTIC CARDIOVASCULAR DISEASE): ICD-10-CM

## 2021-04-20 DIAGNOSIS — R07.89 CHEST TIGHTNESS: ICD-10-CM

## 2021-04-20 PROCEDURE — 93000 ELECTROCARDIOGRAM COMPLETE: CPT | Performed by: INTERNAL MEDICINE

## 2021-04-20 PROCEDURE — 99244 OFF/OP CNSLTJ NEW/EST MOD 40: CPT | Performed by: INTERNAL MEDICINE

## 2021-04-20 PROCEDURE — 3008F BODY MASS INDEX DOCD: CPT | Performed by: INTERNAL MEDICINE

## 2021-04-20 PROCEDURE — 1036F TOBACCO NON-USER: CPT | Performed by: INTERNAL MEDICINE

## 2021-04-20 NOTE — PROGRESS NOTES
Cardiology Office Note  MD Rachael Loving MD Burt Duster, DO, MD Darshan Delong DO, Bhavesh Schmidt DO, [facility] [place]  ----------------------------------------------------------------  [address] [address]    Den Ambrose 41 y o  female MRN: [id number]  Unit/Bed#:  Encounter: [id number]      History of Present Illness: It was a pleasure to see Den Ambrose in the office today for initial CV evaluation  She has a past medical history of alpha thalassemia minor and GERD  For several months, she has been having a feeling of fluttering in the chest that often occurs at rest and most commonly at night  The symptoms seem to come and go occurring couple times per week and resolving  There is no associated lightheadedness, dizziness or loss of consciousness  Her symptoms are left-sided  She also admits to left-sided chest discomfort which was previously evaluated and felt to be musculoskeletal    She describes the chest discomfort as a squeezing sensation on left side of her chest   She has been told that it might be related to lifting her purse recurrently  Despite trying to decrease the weight she lifts, her symptoms still seem to come and go  She denies associated shortness of breath, diaphoresis, nausea  She has or primary care in February 2021 to discuss her symptoms and was subsequently referred to see Cardiology in the office  She has established care with us in April 2021  Currently, she is chest pain-free in the office  Denies lower extremity swelling, orthopnea or paroxysmal nocturnal dyspnea  Of note, she does admit to a in intermittent flushing sensation in her lower extremities that comes and goes fairly erratically  She is somewhat concerned about possible PVD  Review of Systems:  Review of Systems   Constitution: Negative for decreased appetite, fever, weight gain and weight loss     HENT: Negative for congestion and sore throat  Eyes: Negative for visual disturbance  Cardiovascular: Positive for chest pain, claudication and palpitations  Negative for dyspnea on exertion, leg swelling and near-syncope  Respiratory: Negative for cough and shortness of breath  Hematologic/Lymphatic: Negative for bleeding problem  Skin: Negative for rash  Musculoskeletal: Negative for myalgias and neck pain  Gastrointestinal: Negative for abdominal pain and nausea  Neurological: Positive for light-headedness  Negative for weakness  Psychiatric/Behavioral: Negative for depression  Past Medical History:   Diagnosis Date    Alpha thalassemia 2     Anemia     Chronic fatigue     last assessed: 2017    Depression     last assessed: 2015    Esophageal reflux     Globus sensation     last assessed: 2016    Hypochromic anemia     Microcytic thalassemia    Leukocytosis     last assessed: 2/3/2017    Palpitations     resolved: 2017    Thalassemia     Trouble swallowing     Vitamin D deficiency        Past Surgical History:   Procedure Laterality Date     SECTION      MS COLONOSCOPY FLX DX W/COLLJ SPEC WHEN PFRMD N/A 2016    Procedure: EGD AND COLONOSCOPY;  Surgeon: Mike Castro DO;  Location: [facility];   Service: Gastroenterology       Social History     Socioeconomic History    Marital status: /Civil Union     Spouse name: None    Number of children: None    Years of education: None    Highest education level: None   Occupational History    None   Social Needs    Financial resource strain: None    Food insecurity     Worry: None     Inability: None    Transportation needs     Medical: None     Non-medical: None   Tobacco Use    Smoking status: Former Smoker    Smokeless tobacco: Former User     Quit date: 1999    Tobacco comment: never smoker ( as per allscripts)   Substance and Sexual Activity    Alcohol use: No    Drug use: No    Sexual activity: Yes     Partners: Male   Lifestyle    Physical activity     Days per week: None     Minutes per session: None    Stress: None   Relationships    Social connections     Talks on phone: None     Gets together: None     Attends Orthodox service: None     Active member of club or organization: None     Attends meetings of clubs or organizations: None     Relationship status: None    Intimate partner violence     Fear of current or ex partner: None     Emotionally abused: None     Physically abused: None     Forced sexual activity: None   Other Topics Concern    None   Social History Narrative    None       Family History   Problem Relation Age of Onset    Diabetes Mother         mellitus    Thyroid cancer Mother     Kidney cancer Father         primary, with metastasis from kidney to other site    Stroke Father     Diabetes Family         mellitus    Kidney cancer Family        Allergies   Allergen Reactions    Famotidine Diarrhea     Category: Allergy;     Zoloft [Sertraline Hcl]          Current Outpatient Medications:     cholecalciferol (VITAMIN D3) 1,000 units tablet, Take 1,000 Units by mouth daily, Disp: , Rfl:     Vitals:    04/20/21 1000   BP: 116/60   Pulse: 60   Weight: 53 6 kg (118 lb 3 2 oz)   Height: 5' 2" (1 575 m)       PHYSICAL EXAMINATION:  Gen: Awake, Alert, NAD   Head/eyes: AT/NC, pupils equal and round, Anicteric  ENT: mmm  Neck: Supple, No elevated JVP, trachea midline  Resp: CTA bilaterally no w/r/r  CV: RRR +S1, S2, No m/r/g  Abd: Soft, NT/ND + BS  Ext: no LE edema bilaterally  Neuro: Follows commands, moves all extermities  Psych: Appropriate affect, normal mood, pleasant attitude, non-combative  Skin: warm; no rash, erythema or venous stasis changes on exposed skin    --------------------------------------------------------------------------------  TREADMILL STRESS  No results found for this or any previous visit  --------------------------------------------------------------------------------  NUCLEAR STRESS TEST: No results found for this or any previous visit  No results found for this or any previous visit     --------------------------------------------------------------------------------  CATH:  No results found for this or any previous visit   --------------------------------------------------------------------------------  ECHO:   No results found for this or any previous visit  No results found for this or any previous visit   --------------------------------------------------------------------------------  HOLTER  No results found for this or any previous visit  No results found for this or any previous visit   --------------------------------------------------------------------------------  CAROTIDS  No results found for this or any previous visit    --------------------------------------------------------------------------------  ECGs:  Results for orders placed or performed in visit on 04/20/21   POCT ECG    Impression    Sinus rhythm with sinus arrhythmia 60 beats per minute, normal ECG        No results found for: WBC, HGB, HCT, MCV, PLT   No results found for: SODIUM, K, CL, CO2, BUN, CREATININE, GLUC, CALCIUM   No results found for: HGBA1C   No results found for: CHOL  No results found for: HDL  No results found for: LDLCALC  No results found for: TRIG  No results found for: CHOLHDL   No results found for: INR, PROTIME     1  Palpitations  -     POCT ECG  -     AMB extended holter monitor; Future; Expected date: 04/20/2021  -     Stress test only, exercise; Future; Expected date: 04/20/2021  -     Echo complete with contrast if indicated; Future; Expected date: 04/20/2021    2  Chest tightness  -     AMB extended holter monitor; Future; Expected date: 04/20/2021  -     Stress test only, exercise; Future; Expected date: 04/20/2021  -     Echo complete with contrast if indicated;  Future; Expected date: 04/20/2021    3  Pain in both lower extremities  -     VAS lower limb arterial duplex, complete bilateral; Future; Expected date: 04/20/2021    4  ASCVD (arteriosclerotic cardiovascular disease)        IMPRESSION:  · Palpitations  · Precordial chest pain  · LDL of 111 mg/dL, March 2021  · ASCVD 0 4%, April 2021  · Leg pain  · Alpha thalassemia minor    PLAN:  It was a pleasure to see Amarilys in the office today for initial CV evaluation  She is here today due to her symptoms of palpitations, chest discomfort and leg pain  Her palpitations are described as a left-sided fluttering in the chest that come and go  There is no associated lightheadedness  She has had no loss of consciousness  Patient has also had some left-sided chest discomfort that is nonexertional and fairly atypical for cardiovascular chest discomfort  Blood pressure and heart rate are stable in the office  She has no signs of heart failure and examines to be euvolemic  Lower extremity without any evidence of swelling  Pulses appear to be intact  Recent TSH has been within normal limits, blood counts are stable and she has normal creatinine  Based on her clinical presentation, I have the following recommendations:    1  Recommend checking a 2 week Zio patch to assess for any evidence of arrhythmia  2  Check 2D echocardiogram to assess cardiac structure and function  3  We will obtain an exercise stress test to assess for any evidence of underlying myocardial ischemia  4  Recommend avoiding alcohol intake or high quantities of caffeine  5  Patient and I discussed like to undergo arterial venous Doppler to assess for any possible PVD  Clinically, I believe that she is less likely to have PVD, but with her symptoms that are somewhat atypical for claudication, we will obtain an arterial Doppler study for completeness  Will discuss potential evaluation by vascular at our follow-up encounter    6  Should her symptoms worsen in frequency or severity or change in quality, I would instruct her to seek immediate medical attention/dial 911  7  As always, I have recommended a heart healthy diet low in sodium  8  We will follow up with her after testing  As always, please do not hesitate to call with any questions  Portions of the record may have been created with voice recognition software  Occasional wrong word or "sound a like" substitutions may have occurred due to the inherent limitations of voice recognition software  Read the chart carefully and recognize, using context, where substitutions have occurred          Signed: Katherine Jacobson DO, Irena White

## 2021-05-13 ENCOUNTER — HOSPITAL ENCOUNTER (OUTPATIENT)
Dept: NON INVASIVE DIAGNOSTICS | Facility: CLINIC | Age: 46
Discharge: HOME | End: 2021-05-13
Payer: COMMERCIAL

## 2021-05-13 DIAGNOSIS — M79.604 PAIN IN BOTH LOWER EXTREMITIES: ICD-10-CM

## 2021-05-13 DIAGNOSIS — M79.605 PAIN IN BOTH LOWER EXTREMITIES: ICD-10-CM

## 2021-05-13 PROCEDURE — 93925 LOWER EXTREMITY STUDY: CPT | Performed by: SURGERY

## 2021-05-13 PROCEDURE — 93922 UPR/L XTREMITY ART 2 LEVELS: CPT | Performed by: SURGERY

## 2021-05-13 PROCEDURE — 93925 LOWER EXTREMITY STUDY: CPT

## 2021-05-13 PROCEDURE — 93923 UPR/LXTR ART STDY 3+ LVLS: CPT

## 2021-06-08 ENCOUNTER — CLINICAL SUPPORT (OUTPATIENT)
Dept: CARDIOLOGY CLINIC | Facility: CLINIC | Age: 46
End: 2021-06-08
Payer: COMMERCIAL

## 2021-06-08 DIAGNOSIS — R00.2 PALPITATIONS: ICD-10-CM

## 2021-06-08 DIAGNOSIS — R07.89 CHEST TIGHTNESS: ICD-10-CM

## 2021-06-08 PROCEDURE — 93248 EXT ECG>7D<15D REV&INTERPJ: CPT | Performed by: INTERNAL MEDICINE

## 2021-06-29 ENCOUNTER — HOSPITAL ENCOUNTER (OUTPATIENT)
Dept: NON INVASIVE DIAGNOSTICS | Facility: HOSPITAL | Age: 46
Discharge: HOME | End: 2021-06-29
Attending: INTERNAL MEDICINE
Payer: COMMERCIAL

## 2021-06-29 DIAGNOSIS — R00.2 PALPITATIONS: ICD-10-CM

## 2021-06-29 DIAGNOSIS — R07.89 CHEST TIGHTNESS: ICD-10-CM

## 2021-06-29 LAB
CHEST PAIN STATEMENT: NORMAL
MAX DIASTOLIC BP: 50 MMHG
MAX HEART RATE: 164 BPM
MAX PREDICTED HEART RATE: 175 BPM
MAX. SYSTOLIC BP: 124 MMHG
PROTOCOL NAME: NORMAL
TARGET HR FORMULA: NORMAL
TEST INDICATION: NORMAL
TIME IN EXERCISE PHASE: NORMAL

## 2021-06-29 PROCEDURE — 93306 TTE W/DOPPLER COMPLETE: CPT

## 2021-06-29 PROCEDURE — 93356 MYOCRD STRAIN IMG SPCKL TRCK: CPT

## 2021-06-29 PROCEDURE — 93017 CV STRESS TEST TRACING ONLY: CPT

## 2021-06-29 PROCEDURE — 93016 CV STRESS TEST SUPVJ ONLY: CPT | Performed by: INTERNAL MEDICINE

## 2021-06-29 PROCEDURE — 93018 CV STRESS TEST I&R ONLY: CPT | Performed by: INTERNAL MEDICINE

## 2021-08-02 ENCOUNTER — OFFICE VISIT (OUTPATIENT)
Dept: CARDIOLOGY CLINIC | Facility: CLINIC | Age: 46
End: 2021-08-02
Payer: COMMERCIAL

## 2021-08-02 VITALS
HEART RATE: 72 BPM | OXYGEN SATURATION: 98 % | HEIGHT: 62 IN | SYSTOLIC BLOOD PRESSURE: 100 MMHG | WEIGHT: 118 LBS | DIASTOLIC BLOOD PRESSURE: 60 MMHG | BODY MASS INDEX: 21.71 KG/M2

## 2021-08-02 DIAGNOSIS — R00.2 PALPITATIONS: Primary | ICD-10-CM

## 2021-08-02 DIAGNOSIS — R07.89 CHEST TIGHTNESS: ICD-10-CM

## 2021-08-02 DIAGNOSIS — M79.605 PAIN IN BOTH LOWER EXTREMITIES: ICD-10-CM

## 2021-08-02 DIAGNOSIS — M79.604 PAIN IN BOTH LOWER EXTREMITIES: ICD-10-CM

## 2021-08-02 DIAGNOSIS — I25.10 ASCVD (ARTERIOSCLEROTIC CARDIOVASCULAR DISEASE): ICD-10-CM

## 2021-08-02 DIAGNOSIS — I73.9 PVD (PERIPHERAL VASCULAR DISEASE) (HCC): ICD-10-CM

## 2021-08-02 PROCEDURE — 1036F TOBACCO NON-USER: CPT | Performed by: INTERNAL MEDICINE

## 2021-08-02 PROCEDURE — 3008F BODY MASS INDEX DOCD: CPT | Performed by: INTERNAL MEDICINE

## 2021-08-02 PROCEDURE — 99214 OFFICE O/P EST MOD 30 MIN: CPT | Performed by: INTERNAL MEDICINE

## 2021-08-02 NOTE — PROGRESS NOTES
Cardiology Office Note  MD Burak Arias MD Martyn Los, DO, MD Deysi Sarabia DO, Pipe Dennis DO, [facility] [place]  ----------------------------------------------------------------  [facility] [address] [address]    Fadia Werner 40 y o  female MRN: [id number]  Unit/Bed#:  Encounter: [id number]      History of Present Illness: It was a pleasure to see Fadia Werner in the office today for follow-up CV evaluation  She has a past medical history of alpha thalassemia minor and GERD  For several months, she has been having a feeling of fluttering in the chest that often occurs at rest and most commonly at night  The symptoms seem to come and go occurring couple times per week and resolving  There is no associated lightheadedness, dizziness or loss of consciousness  Her symptoms are left-sided  She also admitted to left-sided chest discomfort which was previously evaluated and felt to be musculoskeletal    She describes the chest discomfort as a squeezing sensation on left side of her chest   She has been told that it might be related to lifting her purse recurrently  Despite trying to decrease the weight she lifts, her symptoms still seem to come and go  She denies associated shortness of breath, diaphoresis, nausea  She has or primary care in February 2021 to discuss her symptoms and was subsequently referred to see Cardiology in the office  She has established care with us in April 2021  Currently, she is chest pain-free in the office  Denies lower extremity swelling, orthopnea or paroxysmal nocturnal dyspnea  Of note, she does admit to a in intermittent flushing sensation in her lower extremities that comes and goes fairly erratically  She is somewhat concerned about possible PVD  Review of Systems:  Review of Systems   Constitutional: Negative for decreased appetite, fever, weight gain and weight loss     HENT: Negative for congestion and sore throat  Eyes: Negative for visual disturbance  Cardiovascular: Positive for chest pain and palpitations  Negative for claudication, dyspnea on exertion, leg swelling and near-syncope  Respiratory: Negative for cough and shortness of breath  Hematologic/Lymphatic: Negative for bleeding problem  Skin: Negative for rash  Musculoskeletal: Negative for myalgias and neck pain  Gastrointestinal: Negative for abdominal pain and nausea  Neurological: Negative for light-headedness and weakness  Psychiatric/Behavioral: Negative for depression  Past Medical History:   Diagnosis Date    Alpha thalassemia 2     Anemia     Chronic fatigue     last assessed: 2017    Depression     last assessed: 2015    Esophageal reflux     Globus sensation     last assessed: 2016    Hypochromic anemia     Microcytic thalassemia    Leukocytosis     last assessed: 2/3/2017    Palpitations     resolved: 2017    Thalassemia     Trouble swallowing     Vitamin D deficiency        Past Surgical History:   Procedure Laterality Date     SECTION      [place] COLONOSCOPY FLX DX W/COLLJ SPEC WHEN PFRMD N/A 2016    Procedure: EGD AND COLONOSCOPY;  Surgeon: Milton Colin DO;  Location: [facility] GI LAB;   Service: Gastroenterology       Social History     Socioeconomic History    Marital status: /Civil Union     Spouse name: Not on file    Number of children: Not on file    Years of education: Not on file    Highest education level: Not on file   Occupational History    Not on file   Tobacco Use    Smoking status: Former Smoker    Smokeless tobacco: Former User     Quit date: 1999    Tobacco comment: never smoker ( as per allscripts)   Vaping Use    Vaping Use: Never used   Substance and Sexual Activity    Alcohol use: No    Drug use: No    Sexual activity: Yes     Partners: Male   Other Topics Concern    Not on file   Social History Narrative    Not on file     Social Determinants of Health     Financial Resource Strain:     Difficulty of Paying Living Expenses:    Food Insecurity:     Worried About Running Out of Food in the Last Year:     [address] in the Last Year:    Transportation Needs:     Lack of Transportation (Medical):  Lack of Transportation (Non-Medical):    Physical Activity:     Days of Exercise per Week:     Minutes of Exercise per Session:    Stress:     Feeling of Stress :    Social Connections:     Frequency of Communication with Friends and Family:     Frequency of Social Gatherings with Friends and Family:     Attends Mosque Services:     Active Member of Clubs or Organizations:     Attends Club or Organization Meetings:     Marital Status:    Intimate Partner Violence:     Fear of Current or Ex-Partner:     Emotionally Abused:     Physically Abused:     Sexually Abused:        Family History   Problem Relation Age of Onset    Diabetes Mother         mellitus    Thyroid cancer Mother     Kidney cancer Father         primary, with metastasis from kidney to other site    Stroke Father     Diabetes Family         mellitus    Kidney cancer Family        Allergies   Allergen Reactions    Famotidine Diarrhea     Category: Allergy;     Zoloft [Sertraline Hcl]          Current Outpatient Medications:     cholecalciferol (VITAMIN D3) 1,000 units tablet, Take 1,000 Units by mouth daily, Disp: , Rfl:     Vitals:    08/02/21 1429   BP: 100/60   Pulse: 72   SpO2: 98%   Weight: 53 5 kg (118 lb)   Height: 5' 2" (1 575 m)       PHYSICAL EXAMINATION:  Gen: Awake, Alert, NAD   Head/eyes: AT/NC, pupils equal and round, Anicteric  ENT: mmm  Neck: Supple, No elevated JVP, trachea midline  Resp: CTA bilaterally no w/r/r  CV: RRR +S1, S2, No m/r/g  Abd: Soft, NT/ND + BS  Ext: no LE edema bilaterally  Neuro:  Follows commands, moves all extermities  Psych: Appropriate affect, normal mood, pleasant attitude, non-combative  Skin: warm; no rash, erythema or venous stasis changes on exposed skin    --------------------------------------------------------------------------------  TREADMILL STRESS  No results found for this or any previous visit    --------------------------------------------------------------------------------  NUCLEAR STRESS TEST: No results found for this or any previous visit  No results found for this or any previous visit     --------------------------------------------------------------------------------  CATH:  No results found for this or any previous visit   --------------------------------------------------------------------------------  ECHO:   No results found for this or any previous visit  No results found for this or any previous visit   --------------------------------------------------------------------------------  HOLTER  No results found for this or any previous visit  No results found for this or any previous visit   --------------------------------------------------------------------------------  CAROTIDS  No results found for this or any previous visit    --------------------------------------------------------------------------------  ECGs:  No results found for this visit on 08/02/21  No results found for: WBC, HGB, HCT, MCV, PLT   No results found for: SODIUM, K, CL, CO2, BUN, CREATININE, GLUC, CALCIUM   No results found for: HGBA1C   No results found for: CHOL  No results found for: HDL  No results found for: LDLCALC  No results found for: TRIG  No results found for: CHOLHDL   No results found for: INR, PROTIME     1  Palpitations    2  Chest tightness    3  Pain in both lower extremities    4   ASCVD (arteriosclerotic cardiovascular disease)        IMPRESSION:  · Palpitations  · Precordial chest pain  · LDL of 111 mg/dL, March 2021  · LVEF 65%, normal GLS at -20 6%, mild MAC, trace MR/AR/TR, June 2021  · Exercise stress test is negative for myocardial ischemia, ET 10:22, 93% MPHR, 12 3 METs, June 2021  · Event recorder w/ SR, avg HR 73 bpm, rare APCs, rare VPCs, triggered events correlated with SR, June 2021  · Diffuse mild atherosclerosis of the lower extremities, May 2021  · ASCVD 0 4%, April 2021  · Leg pain  · Alpha thalassemia minor    PLAN:  It was a pleasure to see Amarilys in the office today for initial CV evaluation  Due to her symptoms of chest discomfort and palpitations, she underwent echocardiogram, stress test and event recorder  She is here today to discuss the results  Since our last encounter, she was able to go to [facility] with family and enjoyed herself over 3 weeks  Since coming back, her symptoms have improved  She still does get occasional discomfort in the chest, but she does feel that some form of motion or moving a certain way does elicit it more  She denies lower extremity swelling, orthopnea or paroxysmal nocturnal dyspnea  Admits to intermittent palpitations  Arterial Doppler did show mild bilateral disease  Blood pressure and heart rate are stable  Based on her clinical presentation, I have the following recommendations:    1  Aggressive risk factor and lifestyle modifications with dietary changes including Mediterranean diet  2  We have discussed her LDL cholesterol which with mild atherosclerotic disease should be at less than 70 mg/dL  She would like to try dietary modifications now for any initiation of cholesterol medication  I would prefer both statin therapy and dietary modifications, but given her age, she would like to try dietary changes first  Due to her atherosclerotic arterial disease, we will put in referral for vascular surgery to further discuss the study  3  Continue current medications otherwise without any changes  4  Should she have any worsening of her symptoms especially in frequency or severity or change in quality, I recommend she seek medical attention call the office  5   We will follow up with her in 4 months after repeat LDL cholesterol to discuss any additional interventions at that time    As always, please do not hesitate to call if any questions  Portions of the record may have been created with voice recognition software  Occasional wrong word or "sound a like" substitutions may have occurred due to the inherent limitations of voice recognition software  Read the chart carefully and recognize, using context, where substitutions have occurred        Signed: Gabino Gauthier DO, Tim [place]

## 2021-08-31 ENCOUNTER — CONSULT (OUTPATIENT)
Dept: VASCULAR SURGERY | Facility: CLINIC | Age: 46
End: 2021-08-31
Payer: COMMERCIAL

## 2021-08-31 VITALS
TEMPERATURE: 98.2 F | WEIGHT: 116 LBS | HEART RATE: 64 BPM | DIASTOLIC BLOOD PRESSURE: 74 MMHG | SYSTOLIC BLOOD PRESSURE: 100 MMHG | HEIGHT: 62 IN | BODY MASS INDEX: 21.35 KG/M2

## 2021-08-31 DIAGNOSIS — I73.9 PAD (PERIPHERAL ARTERY DISEASE) (HCC): Primary | ICD-10-CM

## 2021-08-31 DIAGNOSIS — M79.604 PAIN IN BOTH LOWER EXTREMITIES: ICD-10-CM

## 2021-08-31 DIAGNOSIS — M79.605 PAIN IN BOTH LOWER EXTREMITIES: ICD-10-CM

## 2021-08-31 PROCEDURE — 1036F TOBACCO NON-USER: CPT | Performed by: NURSE PRACTITIONER

## 2021-08-31 PROCEDURE — 99243 OFF/OP CNSLTJ NEW/EST LOW 30: CPT | Performed by: NURSE PRACTITIONER

## 2021-08-31 PROCEDURE — 3008F BODY MASS INDEX DOCD: CPT | Performed by: NURSE PRACTITIONER

## 2021-08-31 NOTE — PATIENT INSTRUCTIONS
- Recommend statin therapy  - Consider ASA 81mg daily   - Maintain healthy diet and exercise    Peripheral Artery Disease   AMBULATORY CARE:   Peripheral artery disease (PAD)  is narrow, weak, or blocked arteries  It may affect any arteries outside of your heart and brain  PAD is usually the result of a buildup of fat and cholesterol, also called plaque, along your artery walls  Inflammation, a blood clot, or abnormal cell growth could also block your arteries  PAD prevents normal blood flow to your legs and arms  You are at risk of an amputation if poor blood flow keeps wounds from healing or causes gangrene (tissue death)  Without treatment, PAD can also cause a heart attack or stroke  Common symptoms include:  Mild PAD usually does not cause symptoms  As the disease worsens over time, you may have the following:  · Pain or cramps in your leg or hip while you walk    · A numb, weak, or heavy feeling in your legs    · Dry, scaly, red, or pale skin on your legs    · Thick or brittle nails, or hair loss on your arms and legs    · Foot sores that will not heal    · Burning or aching in your feet and toes while resting (this may be worse when you lie down)    Call your local emergency number (911 in the [address] [place] [address],[address]) if:   · You have any of the following signs of a heart attack:      ? Squeezing, pressure, or pain in your chest    ? You may  also have any of the following:     § Discomfort or pain in your back, neck, jaw, stomach, or arm    § Shortness of breath    § Nausea or vomiting    § Lightheadedness or a sudden cold sweat    · You have any of the following signs of a stroke:      ? Numbness or drooping on one side of your face     ? Weakness in an arm or leg    ? Confusion or difficulty speaking    ? Dizziness, a severe headache, or vision loss    Seek care immediately if:   · You have sores or wounds that will not heal     · You notice black or discolored skin on your arm or leg      · Your skin is cool to the touch  Call your doctor if:   · You have leg pain when you walk 1/8 mile (200 meters) or less, even with treatment  · Your legs are red, dry, or pale, even with treatment  · You have questions or concerns about your condition or care  Treatment for PAD  can help reduce your risk of a heart attack, stroke, or amputation  You may need more than one of the following:  · Medicines  may be given to prevent blood clots and reduce the risk of a heart attack or stroke  You may be given medicine to help prevent your PAD from getting worse  · A supervised exercise program  helps you stay active in normal daily activities  Healthcare providers will help you safely walk or do strength training exercises 3 times a week for 30 to 60 minutes  You will do this for several months, then transition to walking on your own  · Angioplasty  is a procedure to open your artery so blood can flow through normally  A thin tube called a catheter is used to insert a small balloon into your artery  The balloon is inflated to open your blocked artery, and then removed  A tube called a stent may be placed in your artery to hold it open  · Bypass surgery  is used to make a new connection to your artery with a vein from another part of your body, or an artificial graft  The vein or graft is attached to your artery above and below your blockage  This allows blood to flow around the blocked portion of your artery  Manage and prevent PAD:   · Walk for 30 to 60 minutes at least 4 times a week  Your healthcare provider may also refer you to a supervised exercise program  The program helps increase how far you can walk without pain  It also helps you stay active in normal daily activities         · Do not smoke  Nicotine and other chemicals in cigarettes and cigars can worsen PAD  They can also increase your risk for a heart attack or stroke   Ask your healthcare provider for information if you currently smoke and need help to quit  E-cigarettes or smokeless tobacco still contain nicotine  Talk to your healthcare provider before you use these products  · Manage other health conditions  Take your medicines as directed and follow your healthcare provider's instructions if you have high blood pressure or high cholesterol  Perform foot care and check your blood sugar levels as directed if you have diabetes  · Eat heart-healthy foods  Eat whole grains, fruits, and vegetables every day  Limit salt and high-fat foods  Ask your healthcare provider for more information on a heart healthy diet  Ask what a healthy weight is for you  Your healthcare provider can help you create a healthy weight-loss plan, if needed  Follow up with your doctor as directed:  Write down your questions so you remember to ask them during your visits  © Copyright [employer] 2021 Information is for End User's use only and may not be sold, redistributed or otherwise used for commercial purposes  All illustrations and images included in CareNotes® are the copyrighted property of A D A M , Inc  or [facility]   The above information is an  only  It is not intended as medical advice for individual conditions or treatments  Talk to your doctor, nurse or pharmacist before following any medical regimen to see if it is safe and effective for you

## 2021-08-31 NOTE — PROGRESS NOTES
Assessment/Plan:    [facility] ([facility]  Patient is a 71-year-old female with history of alpha thalassemia minor and anemia who presents to the office with c/o "flushing" sensation to legs with elevation and to review findings of CLAUDE  - Pt describes "flushing like water" sensation down bilateral legs when she elevates them  - denies pain, claudication, rest pain, or wounds  No edema  - + PP  CLAUDE 5/13/21 Bilateral diffuse disease t/o femoral and popliteal arteries w/o focal stenosis  EDNA: RIGHT 1 09/110/73,  LEFT 1 16/102/65    Plan:  - stable PAD with normal EDNA's  - Recommend statin therapy per cardiology for optimal medical therapy  - Consider ASA 81mg  Per PCP  - maintain healthy diet and exercise  - follow up PRN/ Call or return to the office with new or worsening symptoms  Diagnoses and all orders for this visit:    PAD (peripheral artery disease) ([facility] )    Pain in both lower extremities  -     Ambulatory referral to Vascular Surgery          Subjective:      Patient ID: Juan Howell is a 40 y o  female  New patient, reports experiencing b/l leg discomfort when legs are elevated  Denies pain, pain walking  No swelling  CLAUDE done 5/13  Presents today for evaluation  Patient is a 71-year-old female with history of alpha thalassemia minor and anemia who presents to the office with c/o "flushing" sensation to legs with elevation and to review findings of CLAUDE  Pt c/o sensation at night or whenever she elevates her legs as a flushing down her legs  She denies pain, swelling, change in color or temperature to BLE  She has +PP bilaterally  Denies rest pain or wounds  Pt seen by cardiology for CP/ fluttering palpitations and "flushing" sensation to LE  Sent for CLAUDE which demonstrated bilateral diffuse disease throughout femoral and popliteal arteries without focal stenosis and normal ABIs    I explained at this time I do not have concern for significant PAD given her imaging results and physical exam     We discussed pathophysiology of arterial disease and optimal medical management with ASA and statin therapy  Statin recommended by cardiology as well for climbing lipid panel values   At this time, pt is interested in making diet modifications and lipid panel will be followed by cardiology  Advised recommended continued healthy lifestyle with diet and exercise  Recommended optimal medical management of PAD with ASA and statin therapies  Return to office with new or worsening symptoms  The following portions of the patient's history were reviewed and updated as appropriate: allergies, current medications, past family history, past medical history, past social history, past surgical history and problem list     Review of Systems   Constitutional: Negative  HENT: Negative  Eyes: Negative  Respiratory: Negative  Cardiovascular: Negative  Gastrointestinal: Negative  Endocrine: Negative  Genitourinary: Negative  Musculoskeletal: Negative  Skin: Negative  Allergic/Immunologic: Negative  Neurological: Negative  Hematological: Negative  Psychiatric/Behavioral: Negative  I have reviewed and made appropriate changes to the review of systems input by the medical assistant  Objective:    /74 (BP Location: Right arm, Patient Position: Sitting)   Pulse 64   Temp 98 2 °F (36 8 °C) (Tympanic)   Ht 5' 2" (1 575 m)   Wt 52 6 kg (116 lb)   BMI 21 22 kg/m²      Physical Exam  Vitals reviewed  Constitutional:       Appearance: Normal appearance  HENT:      Head: Normocephalic  Nose: Nose normal    Eyes:      Extraocular Movements: Extraocular movements intact  Cardiovascular:      Rate and Rhythm: Normal rate and regular rhythm  Pulses: Normal pulses  Radial pulses are 2+ on the right side and 2+ on the left side  Femoral pulses are 2+ on the right side and 2+ on the left side         Dorsalis pedis pulses are 2+ on the right side and 2+ on the left side  Posterior tibial pulses are 2+ on the right side and 2+ on the left side  Heart sounds: Normal heart sounds, S1 normal and S2 normal    Pulmonary:      Effort: Pulmonary effort is normal       Breath sounds: Normal breath sounds  Abdominal:      General: Bowel sounds are normal       Palpations: Abdomen is soft  Musculoskeletal:         General: Normal range of motion  Cervical back: Normal range of motion  Right lower leg: No edema  Left lower leg: No edema  Skin:     General: Skin is warm and dry  Neurological:      Mental Status: She is alert and oriented to person, place, and time  Psychiatric:         Mood and Affect: Mood normal          Behavior: Behavior normal          Vitals:    21 1018   BP: 100/74   BP Location: Right arm   Patient Position: Sitting   Pulse: 64   Temp: 98 2 °F (36 8 °C)   TempSrc: Tympanic   Weight: 52 6 kg (116 lb)   Height: 5' 2" (1 575 m)       Patient Active Problem List   Diagnosis    Alpha thalassemia minor    Anemia    Vitamin D deficiency    [facility]per[facility] ([facility] )       Past Surgical History:   Procedure Laterality Date     SECTION      AL COLONOSCOPY FLX DX W/COLLJ SPEC WHEN PFRMD N/A 2016    Procedure: EGD AND COLONOSCOPY;  Surgeon: Angeles Farrar DO;  Location: [facility] GI LAB;   Service: Gastroenterology       Family History   Problem Relation Age of Onset    Diabetes Mother         mellitus    Thyroid cancer Mother     Kidney cancer Father         primary, with metastasis from kidney to other site    Stroke Father     Diabetes Family         mellitus    Kidney cancer Family        Social History     Socioeconomic History    Marital status: /Civil Union     Spouse name: Not on file    Number of children: Not on file    Years of education: Not on file    Highest education level: Not on file   Occupational History    Not on file Tobacco Use    Smoking status: Former Smoker    Smokeless tobacco: Former User     Quit date: 9/27/1999    Tobacco comment: never smoker ( as per allscripts)   Vaping Use    Vaping Use: Never used   Substance and Sexual Activity    Alcohol use: No    Drug use: No    Sexual activity: Yes     Partners: Male   Other Topics Concern    Not on file   Social History Narrative    Not on file     Social Determinants of Health     Financial Resource Strain:     Difficulty of Paying Living Expenses:    Food Insecurity:     Worried About Running Out of Food in the Last Year:     [address] in the Last Year:    Transportation Needs:     Lack of Transportation (Medical):  Lack of Transportation (Non-Medical):    Physical Activity:     Days of Exercise per Week:     Minutes of Exercise per Session:    Stress:     Feeling of Stress :    Social Connections:     Frequency of Communication with Friends and Family:     Frequency of Social Gatherings with Friends and Family:     Attends Faith Services:     Active Member of Clubs or Organizations:     Attends Club or Organization Meetings:     Marital Status:    Intimate Partner Violence:     Fear of Current or Ex-Partner:     Emotionally Abused:     Physically Abused:     Sexually Abused: Allergies   Allergen Reactions    Famotidine Diarrhea     Category:  Allergy;     Zoloft [Sertraline Hcl]          Current Outpatient Medications:     cholecalciferol (VITAMIN D3) 1,000 units tablet, Take 1,000 Units by mouth daily, Disp: , Rfl:

## 2021-08-31 NOTE — ASSESSMENT & PLAN NOTE
Patient is a 28-year-old female with history of alpha thalassemia minor and anemia who presents to the office with c/o "flushing" sensation to legs with elevation and to review findings of CLAUDE  - Pt describes "flushing like water" sensation down bilateral legs when she elevates them  - denies pain, claudication, rest pain, or wounds  No edema  - + PP  CLAUDE 5/13/21 Bilateral diffuse disease t/o femoral and popliteal arteries w/o focal stenosis  EDNA: RIGHT 1 09/110/73,  LEFT 1 16/102/65    Plan:  - stable PAD with normal EDNA's  - Recommend statin therapy per cardiology for optimal medical therapy  - Consider ASA 81mg  Per PCP  - maintain healthy diet and exercise  - follow up PRN/ Call or return to the office with new or worsening symptoms

## 2021-12-14 ENCOUNTER — OFFICE VISIT (OUTPATIENT)
Dept: CARDIOLOGY CLINIC | Facility: CLINIC | Age: 46
End: 2021-12-14
Payer: COMMERCIAL

## 2021-12-14 VITALS
DIASTOLIC BLOOD PRESSURE: 68 MMHG | WEIGHT: 115.6 LBS | SYSTOLIC BLOOD PRESSURE: 90 MMHG | BODY MASS INDEX: 21.14 KG/M2 | HEART RATE: 68 BPM

## 2021-12-14 DIAGNOSIS — I25.10 ASCVD (ARTERIOSCLEROTIC CARDIOVASCULAR DISEASE): ICD-10-CM

## 2021-12-14 DIAGNOSIS — R07.89 CHEST TIGHTNESS: ICD-10-CM

## 2021-12-14 DIAGNOSIS — I73.9 PVD (PERIPHERAL VASCULAR DISEASE) (HCC): ICD-10-CM

## 2021-12-14 DIAGNOSIS — R00.2 PALPITATIONS: Primary | ICD-10-CM

## 2021-12-14 PROCEDURE — 1036F TOBACCO NON-USER: CPT | Performed by: INTERNAL MEDICINE

## 2021-12-14 PROCEDURE — 99214 OFFICE O/P EST MOD 30 MIN: CPT | Performed by: INTERNAL MEDICINE

## 2022-03-14 ENCOUNTER — OFFICE VISIT (OUTPATIENT)
Dept: FAMILY MEDICINE CLINIC | Facility: CLINIC | Age: 47
End: 2022-03-14
Payer: COMMERCIAL

## 2022-03-14 VITALS
SYSTOLIC BLOOD PRESSURE: 110 MMHG | DIASTOLIC BLOOD PRESSURE: 70 MMHG | TEMPERATURE: 98.1 F | WEIGHT: 121.8 LBS | HEART RATE: 62 BPM | HEIGHT: 62 IN | OXYGEN SATURATION: 99 % | BODY MASS INDEX: 22.41 KG/M2

## 2022-03-14 DIAGNOSIS — Z12.31 VISIT FOR SCREENING MAMMOGRAM: ICD-10-CM

## 2022-03-14 DIAGNOSIS — Z00.00 ANNUAL PHYSICAL EXAM: Primary | ICD-10-CM

## 2022-03-14 DIAGNOSIS — Z13.29 SCREENING FOR THYROID DISORDER: ICD-10-CM

## 2022-03-14 DIAGNOSIS — Z13.89 SCREENING FOR GENITOURINARY CONDITION: ICD-10-CM

## 2022-03-14 DIAGNOSIS — Z12.11 COLON CANCER SCREENING: ICD-10-CM

## 2022-03-14 DIAGNOSIS — Z13.1 SCREENING FOR DIABETES MELLITUS: ICD-10-CM

## 2022-03-14 DIAGNOSIS — K59.00 CONSTIPATION, UNSPECIFIED CONSTIPATION TYPE: ICD-10-CM

## 2022-03-14 DIAGNOSIS — Z78.0 POST-MENOPAUSAL: ICD-10-CM

## 2022-03-14 DIAGNOSIS — Z13.220 NEED FOR LIPID SCREENING: ICD-10-CM

## 2022-03-14 DIAGNOSIS — E55.9 VITAMIN D DEFICIENCY: ICD-10-CM

## 2022-03-14 PROCEDURE — 3008F BODY MASS INDEX DOCD: CPT | Performed by: INTERNAL MEDICINE

## 2022-03-14 PROCEDURE — 3725F SCREEN DEPRESSION PERFORMED: CPT | Performed by: INTERNAL MEDICINE

## 2022-03-14 PROCEDURE — 1036F TOBACCO NON-USER: CPT | Performed by: INTERNAL MEDICINE

## 2022-03-14 PROCEDURE — 99396 PREV VISIT EST AGE 40-64: CPT | Performed by: INTERNAL MEDICINE

## 2022-03-14 RX ORDER — DIPHENOXYLATE HYDROCHLORIDE AND ATROPINE SULFATE 2.5; .025 MG/1; MG/1
1 TABLET ORAL DAILY
COMMUNITY

## 2022-03-14 NOTE — PROGRESS NOTES
Assessment/Plan:           Problem List Items Addressed This Visit        Other    Vitamin D deficiency    Relevant Orders    Vitamin D Panel      Other Visit Diagnoses     Annual physical exam    -  Primary    Constipation, unspecified constipation type        Post-menopausal        Colon cancer screening        Relevant Orders    Ambulatory Referral to Gastroenterology    Screening for thyroid disorder        Relevant Orders    TSH, 3rd generation    Screening for diabetes mellitus        Relevant Orders    Comprehensive metabolic panel    CBC and differential    Need for lipid screening        Relevant Orders    Lipid panel    Screening for genitourinary condition        Relevant Orders    UA (URINE) with reflex to Scope    Visit for screening mammogram        Relevant Orders    Mammo screening bilateral w 3d & cad        GI consultation of ongoing bloatedness despite being using MiraLax on a regular basis  Last colonoscopy 2016 unremarkable  Consideration of IBS  Patient follow-up with gynecologist for Pap and pelvic she would  She wanted to discuss her menopausal status with an endocrinologist and ask for a referral which I think will be more appropriate to discuss with her gynecologist   Encourage active lifestyle  Subjective:      Patient ID: Yaneli Martinez is a 40 y o  female  HPI  Patient is here for annual physical   Patient had not had appear for 18 months  Patient recently had a mammogram which was unremarkable  Patient will be following up with gynecologist   Has been feeling more bloated  She is using MiraLax to help with her symptoms  She had a colonoscopy 2016 which was unremarkable  Encouraged her to follow-up with a gastroenterologist ongoing constipation  She is due for lab studies  Her last LDL was good at 98  Recent cardiology consultation was reviewed  Patient has mild atherosclerotic disease and peripheral arterial disease    Statin therapy has been recommended by vascular and encouraged by Cardiology  Patient has opted to modify her diet  Follow-up lab studies will be ordered  At this point patient does not have any chest discomfort shortness of breath lightheadedness palpitation  Her last stress test have been unremarkable  Last thyroid ultrasound was normal   She has opted not to be COVID vaccinated  The following portions of the patient's history were reviewed and updated as appropriate: allergies, current medications, past family history, past medical history, past social history, past surgical history and problem list     Review of Systems      Objective:      /70 (BP Location: Left arm, Patient Position: Sitting, Cuff Size: Standard)   Pulse 62   Temp 98 1 °F (36 7 °C)   Ht 5' 2" (1 575 m)   Wt 55 2 kg (121 lb 12 8 oz)   SpO2 99%   BMI 22 28 kg/m²          Physical Exam  Constitutional:       General: She is not in acute distress  Appearance: She is well-developed  HENT:      Head: Normocephalic  Mouth/Throat:      Pharynx: No oropharyngeal exudate  Eyes:      General: No scleral icterus  Pupils: Pupils are equal, round, and reactive to light  Neck:      Thyroid: No thyromegaly  Vascular: No carotid bruit  Cardiovascular:      Rate and Rhythm: Normal rate and regular rhythm  Heart sounds: Normal heart sounds  No murmur heard  Pulmonary:      Effort: Pulmonary effort is normal  No respiratory distress  Breath sounds: Normal breath sounds  No wheezing or rales  Abdominal:      General: Bowel sounds are normal  There is no distension  Palpations: Abdomen is soft  Tenderness: There is no abdominal tenderness  There is no right CVA tenderness, left CVA tenderness, guarding or rebound  Musculoskeletal:      Right lower leg: No edema  Left lower leg: No edema  Lymphadenopathy:      Cervical: No cervical adenopathy  Skin:     General: Skin is warm     Neurological:      General: No focal deficit present  Mental Status: She is alert and oriented to person, place, and time  Cranial Nerves: No cranial nerve deficit  Deep Tendon Reflexes: Reflexes are normal and symmetric  Psychiatric:         Mood and Affect: Mood normal          Behavior: Behavior normal          Thought Content: Thought content normal          Judgment: Judgment normal              This note was completed in part utilizing [employer] Direct Software  Grammatical errors, random words insertions, spelling mistakes, incomplete sentences can be occasional consequence of this system secondary to software limitations, ambient noise, and hardware issues  If you have any questions or concerns about the content, text or information contained within the body of this dictation, please contact the provider for clarification    BMI Counseling: Body mass index is 22 28 kg/m²  The BMI is above normal  Exercise recommendations include exercising 3-5 times per week  Rationale for BMI follow-up plan is due to patient being overweight or obese  Depression Screening and Follow-up Plan: Patient was screened for depression during today's encounter  They screened negative with a PHQ-2 score of 1

## 2022-03-15 ENCOUNTER — TELEPHONE (OUTPATIENT)
Dept: ADMINISTRATIVE | Facility: OTHER | Age: 47
End: 2022-03-15

## 2022-03-15 NOTE — TELEPHONE ENCOUNTER
----- Message from Yaima Molina sent at 3/14/2022 10:44 AM EDT -----  Regarding: CARE GAP REQUEST  Contact: [phone]  03/14/22 10:44 AM    Hello, our patient Albert has had Mammogram completed/performed  Please assist in updating the patient chart by pulling the Care Everywhere (CE) document  The date of service is 3/8/22       Thank you,  Yaima Molina  [facility]

## 2022-03-16 NOTE — TELEPHONE ENCOUNTER
Upon review of the In Basket request we were able to locate, review, and update the patient chart as requested for Mammogram     Any additional questions or concerns should be emailed to the Practice Liaisons via [email or web address] email, please do not reply via In Basket      Thank you  Carlos Ramos

## 2022-09-14 NOTE — PROGRESS NOTES
Cardiology Office Note  MD Adam Vallejo MD Emmitt Jain, DO, MD Lubna Richardson DO, Carlos Calabrese DO, [address]  ----------------------------------------------------------------  [address] [facility]  [address] [place], [place] [address]    Marvin Miller 56 y o  female MRN: [id number]  Unit/Bed#:  Encounter: [id number]      History of Present Illness: It was a pleasure to see Marvin Miller in the office today for follow-up CV evaluation  She has a past medical history of alpha thalassemia minor and GERD  She has established care with us in April 2021  For several months, she had been having a feeling of fluttering in the chest that often occurs at rest and most commonly at night  The symptoms seemed to come and go occurring couple times per week and resolving  There is no associated lightheadedness, dizziness or loss of consciousness  Her symptoms are left-sided  She also admitted to left-sided chest discomfort which was previously evaluated and felt to be musculoskeletal    She describes the chest discomfort as a squeezing sensation on left side of her chest   She has been told that it might be related to lifting her purse recurrently  Despite trying to decrease the weight she lifts, her symptoms still seemed to come and go  She denied associated shortness of breath, diaphoresis, nausea  She saw primary care in February 2021 to discuss her symptoms and was subsequently referred to see Cardiology in the office  Due to her symptoms, she was sent for echocardiogram stress test and event recorder  The echocardiogram demonstrated normal left ventricular function without significant valvular disease  Stress test was negative for myocardial ischemia  Event recorder showed triggered events correlated with sinus rhythm and no significant arrhythmia was noted  Since our last encounter, she has had no further episodes of palpitations    Overall, she has been feeling well aside from some mild cramping in the lower extremities  She underwent arterial Doppler of the lower extremities previously which is demonstrated some minimal atherosclerosis of the bilateral lower extremities  Since her ultrasound, she continues to have discomfort in lower extremities that is periodically and changing with exertion  Otherwise she is feeling in her usual state of health  She denies any chest pain, pressure, tightness or squeezing  Denies lightheadedness or dizziness  Denies lower extremity swelling, orthopnea or paroxysmal nocturnal dyspnea  She went to [place] this summer  Review of Systems:  Review of Systems   Constitutional: Negative for decreased appetite, fever, weight gain and weight loss  HENT: Negative for congestion and sore throat  Eyes: Negative for visual disturbance  Cardiovascular: Negative for chest pain, claudication, dyspnea on exertion, leg swelling, near-syncope and palpitations  Respiratory: Negative for cough and shortness of breath  Hematologic/Lymphatic: Negative for bleeding problem  Skin: Negative for rash  Musculoskeletal: Negative for myalgias and neck pain  Gastrointestinal: Negative for abdominal pain and nausea  Neurological: Negative for light-headedness and weakness  Psychiatric/Behavioral: Negative for depression         Past Medical History:   Diagnosis Date    Alpha thalassemia 2     Anemia     Chronic fatigue     last assessed: 2017    Depression     last assessed: 2015    Esophageal reflux     Globus sensation     last assessed: 2016    Hypochromic anemia     Microcytic thalassemia    Leukocytosis     last assessed: 2/3/2017    Palpitations     resolved: 2017    Thalassemia     Trouble swallowing     Vitamin D deficiency        Past Surgical History:   Procedure Laterality Date     SECTION      ND COLONOSCOPY FLX DX W/COLLJ SPEC WHEN PFRMD N/A 2016    Procedure: EGD AND COLONOSCOPY;  Surgeon: Mark Ernst DO;  Location: [facility] [place] [facility]; Service: Gastroenterology       Social History     Socioeconomic History    Marital status: /Civil Union     Spouse name: Not on file    Number of children: Not on file    Years of education: Not on file    Highest education level: Not on file   Occupational History    Not on file   Tobacco Use    Smoking status: Former Smoker    Smokeless tobacco: Former User     Quit date: 9/27/1999    Tobacco comment: never smoker ( as per allscripts)   Vaping Use    Vaping Use: Never used   Substance and Sexual Activity    Alcohol use: No    Drug use: No    Sexual activity: Yes     Partners: Male   Other Topics Concern    Not on file   Social History Narrative    Not on file     Social Determinants of Health     Financial Resource Strain: Not on file   Food Insecurity: Not on file   Transportation Needs: Not on file   Physical Activity: Not on file   Stress: Not on file   Social Connections: Not on file   Intimate Partner Violence: Not on file   Housing Stability: Not on file       Family History   Problem Relation Age of Onset    Diabetes Mother         mellitus    Thyroid cancer Mother     Kidney cancer Father         primary, with metastasis from kidney to other site    Stroke Father     Diabetes Family         mellitus    Kidney cancer Family        Allergies   Allergen Reactions    Famotidine Diarrhea     Category:  Allergy;     Zoloft [Sertraline Hcl]          Current Outpatient Medications:     multivitamin (THERAGRAN) TABS, Take 1 tablet by mouth daily, Disp: , Rfl:     cholecalciferol (VITAMIN D3) 1,000 units tablet, Take 1,000 Units by mouth daily (Patient not taking: Reported on 9/15/2022), Disp: , Rfl:     Vitals:    09/15/22 1001   BP: 100/74   BP Location: Right arm   Patient Position: Sitting   Cuff Size: Standard   Pulse: 62   Weight: 52 3 kg (115 lb 6 4 oz)       PHYSICAL EXAMINATION:  Gen: Awake, Alert, NAD Head/eyes: AT/NC, pupils equal and round, Anicteric  ENT: mmm  Neck: Supple, No elevated JVP, trachea midline  Resp: CTA bilaterally no w/r/r  CV: RRR +S1, S2, No m/r/g  Abd: Soft, NT/ND + BS  Ext: no LE edema bilaterally  Neuro: Follows commands, moves all extermities  Psych: Appropriate affect, happy mood, pleasant attitude, non-combative  Skin: warm; no rash, erythema or venous stasis changes on exposed skin    --------------------------------------------------------------------------------  TREADMILL STRESS  No results found for this or any previous visit    --------------------------------------------------------------------------------  NUCLEAR STRESS TEST: No results found for this or any previous visit  No results found for this or any previous visit     --------------------------------------------------------------------------------  CATH:  No results found for this or any previous visit   --------------------------------------------------------------------------------  ECHO:   No results found for this or any previous visit  No results found for this or any previous visit   --------------------------------------------------------------------------------  HOLTER  No results found for this or any previous visit    No results found for this or any previous visit   --------------------------------------------------------------------------------  CAROTIDS  No results found for this or any previous visit    --------------------------------------------------------------------------------  ECGs:  Results for orders placed or performed in visit on 09/15/22   POCT ECG    Impression    Sinus rhythm 62 bpm, normal ECG        No results found for: WBC, HGB, HCT, MCV, PLT   No results found for: SODIUM, K, CL, CO2, BUN, CREATININE, GLUC, CALCIUM   No results found for: HGBA1C   No results found for: CHOL  No results found for: HDL  No results found for: LDLCALC  No results found for: TRIG  No results found for: CHOLHDL   No results found for: INR, PROTIME     1  Palpitations  -     POCT ECG    2  Chest tightness    3  PVD (peripheral vascular disease) (HCC)  -     VAS lower limb arterial duplex, complete bilateral; Future; Expected date: 09/15/2022    4  ASCVD (arteriosclerotic cardiovascular disease)        IMPRESSION:  · Palpitations  · Event recorder w/ SR, avg HR 73 bpm, rare APCs, rare VPCs, triggered events correlated with SR, June 2021  · LVEF 65%, normal GLS at -20 6%, mild MAC, trace MR/AR/TR, June 2021  · Precordial chest pain  · Exercise stress test is negative for myocardial ischemia, ET 10:22, 93% MPHR, 12 3 METs, June 2021  · LDL of LDL 98 mg/dL, December 2021  · Mild PVD w/ diffuse mild atherosclerosis of the lower extremities, May 2021  · ASCVD 0 4%, April 2021  · Leg pain  · Alpha thalassemia minor    PLAN:  It was a pleasure to see Amarilys in the office today for follow up CV evaluation  Since our last encounter, she has had no further episodes of palpitations  She continues to get some discomfort in the lower extremities since she seen vascular surgery  She has no symptoms concerning for angina and no signs or symptoms of heart failure  She examines to be euvolemic in the office today  Blood pressure and heart rate are currently stable  ECG is nonischemic without any acute changes  She has been tolerating her current medications without any reported adverse effects  Based on her clinical presentation, I have the following recommendations:    1  We have discussed the addition of statin medication  At this time, she feels somewhat reluctant as she does take medication in general and would like to try aggressive dietary modification  2  Risks and benefits of statin therapy have been discussed length  She will call back the office if she would like to add a statin after looking into it further    3  Would encourage 30 minutes a day, 5 days a week of moderate intensity activity to build cardiovascular endurance  4  We have discussed diet such as the Mediterranean and Vegan diet  She will be looking in to if the Vegan diet is reasonable given her anemia  5  No changes to her medications at this time  6  No additional CV testing currently  7  We will obtain a repeat vascular ultrasound to reassess her lower extremity peripheral vascular disease  Would encouraged follow-up with vascular surgery potentially regarding the results or can follow-up in the office with myself  8  We will follow up with her in 6 months to reassess her progress  As always, please do not hesitate to call with any questions  Portions of the record may have been created with voice recognition software  Occasional wrong word or "sound a like" substitutions may have occurred due to the inherent limitations of voice recognition software  Read the chart carefully and recognize, using context, where substitutions have occurred        Signed: Maulik Cardenas DO, [facility] [place], [place], [place]

## 2022-09-15 ENCOUNTER — OFFICE VISIT (OUTPATIENT)
Dept: CARDIOLOGY CLINIC | Facility: CLINIC | Age: 47
End: 2022-09-15
Payer: COMMERCIAL

## 2022-09-15 VITALS
WEIGHT: 115.4 LBS | BODY MASS INDEX: 21.11 KG/M2 | DIASTOLIC BLOOD PRESSURE: 74 MMHG | HEART RATE: 62 BPM | SYSTOLIC BLOOD PRESSURE: 100 MMHG

## 2022-09-15 DIAGNOSIS — R00.2 PALPITATIONS: Primary | ICD-10-CM

## 2022-09-15 DIAGNOSIS — I25.10 ASCVD (ARTERIOSCLEROTIC CARDIOVASCULAR DISEASE): ICD-10-CM

## 2022-09-15 DIAGNOSIS — R07.89 CHEST TIGHTNESS: ICD-10-CM

## 2022-09-15 DIAGNOSIS — I73.9 PVD (PERIPHERAL VASCULAR DISEASE) (HCC): ICD-10-CM

## 2022-09-15 PROCEDURE — 93000 ELECTROCARDIOGRAM COMPLETE: CPT | Performed by: INTERNAL MEDICINE

## 2022-09-15 PROCEDURE — 99214 OFFICE O/P EST MOD 30 MIN: CPT | Performed by: INTERNAL MEDICINE

## 2022-09-16 ENCOUNTER — CONSULT (OUTPATIENT)
Dept: GASTROENTEROLOGY | Facility: CLINIC | Age: 47
End: 2022-09-16
Payer: COMMERCIAL

## 2022-09-16 VITALS
WEIGHT: 116.4 LBS | SYSTOLIC BLOOD PRESSURE: 100 MMHG | BODY MASS INDEX: 21.42 KG/M2 | DIASTOLIC BLOOD PRESSURE: 70 MMHG | TEMPERATURE: 97.7 F | HEIGHT: 62 IN

## 2022-09-16 DIAGNOSIS — Z12.11 COLON CANCER SCREENING: ICD-10-CM

## 2022-09-16 PROCEDURE — 99243 OFF/OP CNSLTJ NEW/EST LOW 30: CPT | Performed by: INTERNAL MEDICINE

## 2022-09-16 NOTE — PATIENT INSTRUCTIONS
Scheduled date of colonoscopy (as of today):12 08 22  Physician performing colonoscopy:DR POWELL  Location of colonoscopy:[facility]  Bowel prep reviewed with patient:MIRALAX/DULCOLAX  Instructions reviewed with patient by:HUBER  Clearances:  N/A

## 2022-09-16 NOTE — PROGRESS NOTES
[facility] - Outpatient Consultation  [facility] 56 y o  female MRN: [id number]  Encounter: [id number]          ASSESSMENT AND PLAN:      1  Colon cancer screening  Plan for age-appropriate screening colonoscopy  She did have a prior colonoscopy roughly 6 years ago which was done for diagnostic purposes and overall unremarkable, performed for indication of diarrhea which is now completely resolved  She is asymptomatic and currently low risk with no family history  Benefits and risks of procedure discussed in detail, she is agreeable to proceed  - Ambulatory Referral to Gastroenterology  - Colonoscopy; Future    ______________________________________________________________________    HPI:  Ms Manish Barrett is a pleasant 15-year-old female presenting for discussion of colorectal cancer screening  She previously saw Dr Edmar Wray in 2016 for symptoms of dysphagia as well as severe diarrhea  She had an EGD and colonoscopy at that time both of which were unremarkable  Fortunately since then she has had no major issues from a GI perspective  She has occasional bloating with dairy products  She has occasional constipation usually when on vacation or otherwise traveling  There is no family history of colorectal cancer  She denies any abdominal surgeries other than prior   REVIEW OF SYSTEMS:    CONSTITUTIONAL: Denies any fever, chills, rigors, and weight loss  HEENT: Denies odynophagia, tinnitus  CARDIOVASCULAR: No chest pain or palpitations  RESPIRATORY: Denies any cough, hemoptysis, shortness of breath or dyspnea on exertion  GASTROINTESTINAL: As noted in the History of Present Illness  GENITOURINARY: No problems with urination  Denies any hematuria or dysuria  NEUROLOGIC: No dizziness or vertigo, denies headaches  MUSCULOSKELETAL: Denies any muscle or joint pain  SKIN: Denies skin rashes or itching     ENDOCRINE:  Denies intolerance to heat or cold   PSYCHOSOCIAL: Denies depression or anxiety  Denies any recent memory loss  Historical Information   Past Medical History:   Diagnosis Date    Alpha thalassemia 2     Anemia     Chronic fatigue     last assessed: 2017    Depression     last assessed: 2015    Esophageal reflux     Globus sensation     last assessed: 2016    Hypochromic anemia     Microcytic thalassemia    Leukocytosis     last assessed: 2/3/2017    Palpitations     resolved: 2017    Thalassemia     Trouble swallowing     Vitamin D deficiency      Past Surgical History:   Procedure Laterality Date     SECTION      COLONOSCOPY      IA COLONOSCOPY FLX DX W/COLLJ SPEC WHEN PFRMD N/A 2016    Procedure: EGD AND COLONOSCOPY;  Surgeon: Reinier Sow DO;  Location: [facility]; Service: Gastroenterology    UPPER GASTROINTESTINAL ENDOSCOPY       Social History   Social History     Substance and Sexual Activity   Alcohol Use No     Social History     Substance and Sexual Activity   Drug Use No     Social History     Tobacco Use   Smoking Status Former Smoker   Smokeless Tobacco Former User    Quit date: 1999   Tobacco Comment    never smoker ( as per allscripts)     Family History   Problem Relation Age of Onset    Diabetes Mother         mellitus    Thyroid cancer Mother     Kidney cancer Father         primary, with metastasis from kidney to other site    Stroke Father     Diabetes Family         mellitus    Kidney cancer Family        Meds/Allergies       Current Outpatient Medications:     multivitamin (THERAGRAN) TABS    cholecalciferol (VITAMIN D3) 1,000 units tablet    Allergies   Allergen Reactions    Famotidine Diarrhea     Category: Allergy;     Zoloft [Sertraline Hcl]            Objective     Blood pressure 100/70, temperature 97 7 °F (36 5 °C), temperature source Tympanic, height 5' 2" (1 575 m), weight 52 8 kg (116 lb 6 4 oz)   Body mass index is 21 29 kg/m²         PHYSICAL EXAM:      General Appearance:   Alert, cooperative, no distress   HEENT:   Normocephalic, atraumatic, anicteric  Neck:  Supple, symmetrical, trachea midline   Lungs:   Clear to auscultation bilaterally; no rales, rhonchi or wheezing; respirations unlabored    Heart[de-identified]   Regular rate and rhythm; no murmur, rub, or gallop  Abdomen:   Soft, non-tender, non-distended; normal bowel sounds; no masses, no organomegaly    Genitalia:   Deferred    Rectal:   Deferred    Extremities:  No cyanosis, clubbing or edema    Pulses:  2+ and symmetric    Skin:  No jaundice, rashes, or lesions          Lab Results:   No visits with results within 1 Day(s) from this visit  Latest known visit with results is:   Hospital Outpatient Visit on 06/29/2021   Component Date Value    Protocol Name 06/29/2021 Kirsten Jameson Time In Exercise Phase 06/29/2021 00:10:22     MAX  SYSTOLIC BP 32/74/4717 049     Max Diastolic Bp 91/67/5161 50     Max Heart Rate 06/29/2021 164     Max Predicted Heart Rate 06/29/2021 175     Reason for Termination 06/29/2021                      Value:Target Heart Rate Achieved  Fatigue  Dyspnea      Test Indication 06/29/2021 Chest pain, palpitations     Target Hr Formular 06/29/2021 (220 - Age)*85%     Chest Pain Statement 06/29/2021 none          Radiology Results:   No results found

## 2022-11-10 ENCOUNTER — HOSPITAL ENCOUNTER (OUTPATIENT)
Dept: NON INVASIVE DIAGNOSTICS | Facility: CLINIC | Age: 47
Discharge: HOME | End: 2022-11-10

## 2022-11-10 DIAGNOSIS — I73.9 PVD (PERIPHERAL VASCULAR DISEASE) (HCC): ICD-10-CM

## 2022-11-15 ENCOUNTER — OFFICE VISIT (OUTPATIENT)
Dept: VASCULAR SURGERY | Facility: CLINIC | Age: 47
End: 2022-11-15

## 2022-11-15 VITALS
WEIGHT: 120.4 LBS | SYSTOLIC BLOOD PRESSURE: 90 MMHG | HEIGHT: 62 IN | BODY MASS INDEX: 22.16 KG/M2 | DIASTOLIC BLOOD PRESSURE: 62 MMHG | OXYGEN SATURATION: 98 % | HEART RATE: 65 BPM

## 2022-11-15 DIAGNOSIS — I73.9 PAD (PERIPHERAL ARTERY DISEASE) (HCC): Primary | ICD-10-CM

## 2022-11-15 NOTE — PATIENT INSTRUCTIONS
Lower extremity arterial duplex in 2 years  Continue healthy diet and exercise  Return PRN    Peripheral Artery Disease   AMBULATORY CARE:   Peripheral artery disease (PAD)  is narrow, weak, or blocked arteries  It may affect any arteries outside of your heart and brain  PAD is usually the result of a buildup of fat and cholesterol, also called plaque, along your artery walls  Inflammation, a blood clot, or abnormal cell growth could also block your arteries  PAD prevents normal blood flow to your legs and arms  You are at risk of an amputation if poor blood flow keeps wounds from healing or causes gangrene (tissue death)  Without treatment, PAD can also cause a heart attack or stroke  Common symptoms include:  Mild PAD usually does not cause symptoms  As the disease worsens over time, you may have the following:  Pain or cramps in your leg or hip while you walk    A numb, weak, or heavy feeling in your legs    Dry, scaly, red, or pale skin on your legs    Thick or brittle nails, or hair loss on your arms and legs    Foot sores that will not heal    Burning or aching in your feet and toes while resting (this may be worse when you lie down)    Call your local emergency number (911 in the [address] [facility]) if:   You have any of the following signs of a heart attack:      Squeezing, pressure, or pain in your chest    You may  also have any of the following:     Discomfort or pain in your back, neck, jaw, stomach, or arm    Shortness of breath    Nausea or vomiting    Lightheadedness or a sudden cold sweat    You have any of the following signs of a stroke:      Numbness or drooping on one side of your face     Weakness in an arm or leg    Confusion or difficulty speaking    Dizziness, a severe headache, or vision loss    Seek care immediately if:   You have sores or wounds that will not heal     You notice black or discolored skin on your arm or leg  Your skin is cool to the touch      Call your doctor if:   You have leg pain when you walk 1/8 mile (200 meters) or less, even with treatment  Your legs are red, dry, or pale, even with treatment  You have questions or concerns about your condition or care  Treatment for PAD  can help reduce your risk of a heart attack, stroke, or amputation  You may need more than one of the following:  Medicines  may be given to prevent blood clots and reduce the risk of a heart attack or stroke  You may be given medicine to help prevent your PAD from getting worse  A supervised exercise program  helps you stay active in normal daily activities  Healthcare providers will help you safely walk or do strength training exercises 3 times a week for 30 to 60 minutes  You will do this for several months, then transition to walking on your own  Angioplasty  is a procedure to open your artery so blood can flow through normally  A thin tube called a catheter is used to insert a small balloon into your artery  The balloon is inflated to open your blocked artery, and then removed  A tube called a stent may be placed in your artery to hold it open  Bypass surgery  is used to make a new connection to your artery with a vein from another part of your body, or an artificial graft  The vein or graft is attached to your artery above and below your blockage  This allows blood to flow around the blocked portion of your artery  Manage and prevent PAD:   Walk for 30 to 60 minutes at least 4 times a week  Your healthcare provider may also refer you to a supervised exercise program  The program helps increase how far you can walk without pain  It also helps you stay active in normal daily activities         Do not smoke  Nicotine and other chemicals in cigarettes and cigars can worsen PAD  They can also increase your risk for a heart attack or stroke  Ask your healthcare provider for information if you currently smoke and need help to quit  E-cigarettes or smokeless tobacco still contain nicotine   Talk to your healthcare provider before you use these products  Manage other health conditions  Take your medicines as directed and follow your healthcare provider's instructions if you have high blood pressure or high cholesterol  Perform foot care and check your blood sugar levels as directed if you have diabetes  Eat heart-healthy foods  Eat whole grains, fruits, and vegetables every day  Limit salt and high-fat foods  Ask your healthcare provider for more information on a heart healthy diet  Ask what a healthy weight is for you  Your healthcare provider can help you create a healthy weight-loss plan, if needed  Follow up with your doctor as directed:  Write down your questions so you remember to ask them during your visits  © [employer] 2022 Information is for End User's use only and may not be sold, redistributed or otherwise used for commercial purposes  All illustrations and images included in CareNotes® are the copyrighted property of [employer]  or Formerly named [facility]   The above information is an  only  It is not intended as medical advice for individual conditions or treatments  Talk to your doctor, nurse or pharmacist before following any medical regimen to see if it is safe and effective for you

## 2022-11-15 NOTE — PROGRESS NOTES
Assessment/Plan:    PAD (peripheral artery disease) ([facility] [address] )  54yo female with alpha thalassemia minor, anemia and diffuse BLE PAD presents to review imaging with ongoing c/o "flushing" sensation to legs with elevation     - Pt describes "flushing like water" sensation down bilateral legs when she elevates them  - symptoms have improved since last seen in vascular office  - patient exercises regularly  - denies pain, claudication, rest pain, or wounds  - No edema  - palpable distal pulses bilaterally    LEAD 11/10/2022- without significant change or progression of disease  Bilateral diffuse femoral/popliteal disease without focal stenosis and normal ABIs  R 1 19/110/90  L 1 23/100/99    Plan:  - stable PAD with normal EDNA's  - Recommend statin therapy per PCP for optimal medical therapy  Patient is not interested at this time  Lipid panel WNL:/HDL 74/LDL 99  - Consider ASA 81mg  Per PCP  - maintain healthy diet and exercise  - repeat lower extremity arterial duplex in 2 years  - follow up PRN/ Call or return to the office with new or worsening symptoms  Diagnoses and all orders for this visit:    PAD (peripheral artery disease) ([facility])  -     VAS lower limb arterial duplex, complete bilateral; Future          Subjective:      Patient ID: Demetris Timmons is a 56 y o  female  Patient is here today to review results of CLAUDE done 11/10/2022  Patient was referred back to this office by Dr Camara(cardiology) Patient c/o a "flushing" sensation in both of her legs when they are elevated on the bed at night  She states that she is getting the sensation less often since her last visit 8/2021  She denies any open wounds  Patient is a former smoker  HPI  See assessment and plan    67-year-old female without significant pertinent medical history presents with continued complaints of BLE “flushing” symptom to BLE when legs are elevated    Recent arterial duplex demonstrates diffuse disease without focal stenosis and per well preserved ABIs  No edema, palpable distal pulses bilaterally  Patient is active with daily exercise and maintains adequate diet  She is on no medications  We discussed recommend optimal medical management with daily statin aspirin however patient desires to maintain with diet and exercise  Lipid panel normal   No further imaging indicated at this time, however patient would like to continue follow-up  Will repeat arterial duplex in 2 years  Advised for patient to return with any new or worsening symptoms, questions or concerns  The following portions of the patient's history were reviewed and updated as appropriate: allergies, current medications, past family history, past medical history, past social history, past surgical history and problem list     Review of Systems   Constitutional: Negative  HENT: Negative  Eyes: Negative  Respiratory: Negative  Cardiovascular: Negative  Gastrointestinal: Negative  Endocrine: Negative  Genitourinary: Negative  Musculoskeletal:        Leg pain   Skin: Negative  Allergic/Immunologic: Negative  Neurological: Negative  Hematological: Negative  Psychiatric/Behavioral: Negative  I have reviewed and made appropriate changes to the review of systems input by the medical assistant  Objective:      BP 90/62 (BP Location: Left arm, Patient Position: Sitting, Cuff Size: Standard)   Pulse 65   Ht 5' 2" (1 575 m)   Wt 54 6 kg (120 lb 6 4 oz)   SpO2 98%   BMI 22 02 kg/m²          Physical Exam  Vitals reviewed  Constitutional:       Appearance: Normal appearance  She is normal weight  HENT:      Head: Normocephalic and atraumatic  Neck:      Vascular: No carotid bruit  Cardiovascular:      Rate and Rhythm: Normal rate and regular rhythm  Pulses: Normal pulses  Carotid pulses are 2+ on the right side and 2+ on the left side         Radial pulses are 2+ on the right side and 2+ on the left side  Dorsalis pedis pulses are 2+ on the right side and 2+ on the left side  Posterior tibial pulses are 2+ on the right side and 2+ on the left side  Heart sounds: Normal heart sounds  No murmur heard  Pulmonary:      Effort: Pulmonary effort is normal  No respiratory distress  Breath sounds: Normal breath sounds  No wheezing  Musculoskeletal:         General: Normal range of motion  Cervical back: Normal range of motion and neck supple  Right lower leg: No edema  Left lower leg: No edema  Skin:     General: Skin is warm and dry  Neurological:      General: No focal deficit present  Mental Status: She is alert and oriented to person, place, and time  Sensory: No sensory deficit  Motor: No weakness  Psychiatric:         Mood and Affect: Mood normal          Behavior: Behavior normal            Vitals:    11/15/22 1100   BP: 90/62   BP Location: Left arm   Patient Position: Sitting   Cuff Size: Standard   Pulse: 65   SpO2: 98%   Weight: 54 6 kg (120 lb 6 4 oz)   Height: 5' 2" (1 575 m)       Patient Active Problem List   Diagnosis   • Alpha thalassemia minor   • Anemia   • Vitamin D deficiency   • PAD (peripheral artery disease) (HCC)       Past Surgical History:   Procedure Laterality Date   •  SECTION     • COLONOSCOPY     • NM COLONOSCOPY FLX DX W/COLLJ SPEC WHEN PFRMD N/A 2016    Procedure: EGD AND COLONOSCOPY;  Surgeon: Khoa Wayne DO;  Location: [facility] GI [facility];   Service: Gastroenterology   • UPPER GASTROINTESTINAL ENDOSCOPY         Family History   Problem Relation Age of Onset   • Diabetes Family         mellitus   • Kidney cancer Family    • Diabetes Mother         mellitus   • Thyroid cancer Mother    • Kidney cancer Father         primary, with metastasis from kidney to other site   • Stroke Father        Social History     Socioeconomic History   • Marital status: /Civil Union     Spouse name: Not on file   • Number of children: Not on file   • Years of education: Not on file   • Highest education level: Not on file   Occupational History   • Not on file   Tobacco Use   • Smoking status: Former Smoker   • Smokeless tobacco: Former User     Quit date: 9/27/1999   • Tobacco comment: never smoker ( as per allscripts)   Vaping Use   • Vaping Use: Never used   Substance and Sexual Activity   • Alcohol use: No   • Drug use: No   • Sexual activity: Yes     Partners: Male   Other Topics Concern   • Not on file   Social History Narrative   • Not on file     Social Determinants of Health     Financial Resource Strain: Not on file   Food Insecurity: Not on file   Transportation Needs: Not on file   Physical Activity: Not on file   Stress: Not on file   Social Connections: Not on file   Intimate Partner Violence: Not on file   Housing Stability: Not on file       Allergies   Allergen Reactions   • Famotidine Diarrhea     Category:  Allergy;    • Zoloft [Sertraline Hcl]          Current Outpatient Medications:   •  multivitamin (THERAGRAN) TABS, Take 1 tablet by mouth daily, Disp: , Rfl:   •  cholecalciferol (VITAMIN D3) 1,000 units tablet, Take 1,000 Units by mouth daily (Patient not taking: No sig reported), Disp: , Rfl:

## 2022-12-05 ENCOUNTER — TELEPHONE (OUTPATIENT)
Dept: CARDIOLOGY CLINIC | Facility: CLINIC | Age: 47
End: 2022-12-05

## 2022-12-05 NOTE — TELEPHONE ENCOUNTER
----- Message from Jerry Oleary DO sent at 12/5/2022  1:24 PM EST -----  The lower extremity arterial Doppler demonstrated no significant change compared to study from May 13, 2021  Please reach out to the patient and shared with her this stable news  Thank you

## 2022-12-05 NOTE — TELEPHONE ENCOUNTER
Called pt and gave her your msg   She already had a f/u with vascular so she knew this and said she doesn't need to see you so I took her out of recall

## 2023-03-09 ENCOUNTER — RA CDI HCC (OUTPATIENT)
Dept: OTHER | Facility: HOSPITAL | Age: 48
End: 2023-03-09

## 2023-03-09 NOTE — PROGRESS NOTES
PAD (peripheral artery disease) (HCC)Noted 8/31/2021  [I73 9]      NOT on the BPA- please assess using MEAT for 2023 billing    [facility] [address]  coding opportunities          Chart Reviewed number of suggestions sent to Provider: 1     Patients Insurance        Commercial Insurance: [address] [place] [address]

## 2023-03-15 ENCOUNTER — OFFICE VISIT (OUTPATIENT)
Dept: FAMILY MEDICINE CLINIC | Facility: CLINIC | Age: 48
End: 2023-03-15

## 2023-03-15 VITALS
BODY MASS INDEX: 21.71 KG/M2 | RESPIRATION RATE: 16 BRPM | WEIGHT: 118 LBS | HEIGHT: 62 IN | OXYGEN SATURATION: 98 % | SYSTOLIC BLOOD PRESSURE: 100 MMHG | DIASTOLIC BLOOD PRESSURE: 68 MMHG | TEMPERATURE: 98.2 F | HEART RATE: 68 BPM

## 2023-03-15 DIAGNOSIS — Z80.8 FAMILY HISTORY OF THYROID CANCER: ICD-10-CM

## 2023-03-15 DIAGNOSIS — Z00.00 ANNUAL PHYSICAL EXAM: ICD-10-CM

## 2023-03-15 DIAGNOSIS — Z11.59 NEED FOR HEPATITIS C SCREENING TEST: ICD-10-CM

## 2023-03-15 DIAGNOSIS — Z13.29 SCREENING FOR THYROID DISORDER: ICD-10-CM

## 2023-03-15 DIAGNOSIS — Z11.4 SCREENING FOR HIV (HUMAN IMMUNODEFICIENCY VIRUS): ICD-10-CM

## 2023-03-15 DIAGNOSIS — E55.9 VITAMIN D DEFICIENCY: ICD-10-CM

## 2023-03-15 DIAGNOSIS — Z13.1 SCREENING FOR DIABETES MELLITUS: ICD-10-CM

## 2023-03-15 DIAGNOSIS — Z12.31 BREAST CANCER SCREENING BY MAMMOGRAM: Primary | ICD-10-CM

## 2023-03-15 DIAGNOSIS — D56.3 ALPHA THALASSEMIA MINOR: ICD-10-CM

## 2023-03-15 DIAGNOSIS — Z13.220 NEED FOR LIPID SCREENING: ICD-10-CM

## 2023-03-15 DIAGNOSIS — Z13.89 SCREENING FOR GENITOURINARY CONDITION: ICD-10-CM

## 2023-03-15 RX ORDER — SOD SULF/POT CHLORIDE/MAG SULF 1.479 G
TABLET ORAL
COMMUNITY
Start: 2023-01-18

## 2023-03-15 NOTE — PROGRESS NOTES
ADULT ANNUAL [address] [facility] PRIMARY CARE [facility]    NAME: Tomasa Guzmán  AGE: 56 y o  SEX: female  : 1975     DATE: 3/15/2023     Assessment and Plan:     Problem List Items Addressed This Visit        Other    Alpha thalassemia minor    Vitamin D deficiency    Relevant Orders    Vitamin D Panel   Other Visit Diagnoses     Breast cancer screening by mammogram    -  Primary    Annual physical exam        Need for hepatitis C screening test        Relevant Orders    Hepatitis C Antibody ([facility])    Screening for HIV (human immunodeficiency virus)        Relevant Orders    HIV 1/2 AG/AB w Reflex [facility] for 2 yr old and above    Screening for thyroid disorder        Relevant Orders    TSH, 3rd generation with Free T4 reflex    Screening for diabetes mellitus        Relevant Orders    CBC and differential    Comprehensive metabolic panel    Need for lipid screening        Relevant Orders    Lipid panel    Screening for genitourinary condition        Relevant Orders    UA (URINE) with reflex to Scope    Family history of thyroid cancer        Relevant Orders    US thyroid    TSH, 3rd generation with Free T4 reflex          Immunizations and preventive care screenings were discussed with patient today  Appropriate education was printed on patient's after visit summary  Counseling:  · Preventative screening         No follow-ups on file  Chief Complaint:     Chief Complaint   Patient presents with   • Physical Exam      History of Present Illness:     Adult Annual Physical   Patient here for a comprehensive physical exam  The patient reports problems - Patient is very anxious about her mother having recurrence of thyroid cancer  Thyroid ultrasound several years ago for the patient was unremarkable  We will do another thyroid ultrasound  She does have mild cough  Denies any fever chills nausea vomiting sore throat    She reports feeling of something stuck in her throat  Patient had been diagnosed with globus sensation by ENT when seen in the past   Patient had a colonoscopy in outside facility  She would try to contact her GI first to receive a copy  She follows Dr Marco Antonio Avina at [facility] for GYN and has been getting MRI and ultrasound of the breast     Diet and Physical Activity  · Diet/Nutrition: well balanced diet  · Exercise: walking  Depression Screening  PHQ-2/9 Depression Screening    Little interest or pleasure in doing things: 1 - several days  Feeling down, depressed, or hopeless: 1 - several days  PHQ-2 Score: 2  PHQ-2 Interpretation: Negative depression screen       General Health  · Sleep: sleeps well  · Hearing: normal - bilateral   · Vision: no vision problems  · Dental: regular dental visits  /GYN Health  · Patient is: premenopausal  · Last menstrual period: As above  · Contraceptive method: oral contraceptives  Review of Systems:     Review of Systems   Constitutional: Negative for chills, fever and unexpected weight change  Respiratory: Positive for cough  Negative for shortness of breath  Cardiovascular: Negative for chest pain, palpitations and leg swelling  Gastrointestinal: Negative for abdominal pain, blood in stool, constipation, diarrhea and nausea  Neurological: Negative for dizziness  Psychiatric/Behavioral: The patient is nervous/anxious         Past Medical History:     Past Medical History:   Diagnosis Date   • Alpha thalassemia 2    • Anemia    • Chronic fatigue     last assessed: 8/7/2017   • Depression     last assessed: 12/22/2015   • Esophageal reflux    • Globus sensation     last assessed: 12/7/2016   • Hypochromic anemia     Microcytic thalassemia   • Leukocytosis     last assessed: 2/3/2017   • Palpitations     resolved: 8/7/2017   • Thalassemia    • Trouble swallowing    • Vitamin D deficiency       Past Surgical History:     Past Surgical History: Procedure Laterality Date   •  SECTION     • COLONOSCOPY     • NC COLONOSCOPY FLX DX W/COLLJ SPEC WHEN PFRMD N/A 2016    Procedure: EGD AND COLONOSCOPY;  Surgeon: Dia Nielson DO;  Location: [facility] GI LAB;   Service: Gastroenterology   • UPPER GASTROINTESTINAL ENDOSCOPY        Social History:     Social History     Socioeconomic History   • Marital status: /Civil Union     Spouse name: None   • Number of children: None   • Years of education: None   • Highest education level: None   Occupational History   • None   Tobacco Use   • Smoking status: Former   • Smokeless tobacco: Former     Quit date: 1999   • Tobacco comments:     never smoker ( as per allscripts)   Vaping Use   • Vaping Use: Never used   Substance and Sexual Activity   • Alcohol use: No   • Drug use: No   • Sexual activity: Yes     Partners: Male   Other Topics Concern   • None   Social History Narrative   • None     Social Determinants of Health     Financial Resource Strain: Not on file   Food Insecurity: Not on file   Transportation Needs: Not on file   Physical Activity: Not on file   Stress: Not on file   Social Connections: Not on file   Intimate Partner Violence: Not on file   Housing Stability: Not on file      Family History:     Family History   Problem Relation Age of Onset   • Diabetes Family         mellitus   • Kidney cancer Family    • Diabetes Mother         mellitus   • Thyroid cancer Mother    • Kidney cancer Father         primary, with metastasis from kidney to other site   • Stroke Father       Current Medications:     Current Outpatient Medications   Medication Sig Dispense Refill   • multivitamin (THERAGRAN) TABS Take 1 tablet by mouth daily     • cholecalciferol (VITAMIN D3) 1,000 units tablet Take 1,000 Units by mouth daily (Patient not taking: Reported on 9/15/2022)     • Multiple Vitamins-Minerals (MULTIVITAMIN ADULT EXTRA C PO) Take by mouth (Patient not taking: Reported on 3/15/2023)     • Sutab 4319-438-481 MG TABS USE AS DIRECTED BIN: [id number] / PCN: [id number] / GROUP: [id number] / MEMBER ID: [id number]       No current facility-administered medications for this visit  Allergies: Allergies   Allergen Reactions   • Famotidine Diarrhea     Category: Allergy;    • Zoloft [Sertraline Hcl]       Physical Exam:     /68 (BP Location: Left arm, Patient Position: Sitting, Cuff Size: Standard)   Pulse 68   Temp 98 2 °F (36 8 °C) (Tympanic)   Resp 16   Ht 5' 2" (1 575 m)   Wt 53 5 kg (118 lb)   SpO2 98%   BMI 21 58 kg/m²     Physical Exam  Constitutional:       Appearance: She is not diaphoretic  HENT:      Head: Normocephalic and atraumatic  Eyes:      General: No scleral icterus  Conjunctiva/sclera: Conjunctivae normal    Neck:      Thyroid: No thyromegaly  Vascular: No carotid bruit  Cardiovascular:      Rate and Rhythm: Normal rate and regular rhythm  Heart sounds: Normal heart sounds  No murmur heard  Pulmonary:      Effort: Pulmonary effort is normal  No respiratory distress  Breath sounds: Normal breath sounds  No stridor  No wheezing or rales  Abdominal:      General: Bowel sounds are normal  There is no distension  Palpations: Abdomen is soft  There is no mass  Tenderness: There is no abdominal tenderness  There is no left CVA tenderness or guarding  Musculoskeletal:      Cervical back: Neck supple  Right lower leg: No edema  Left lower leg: No edema  Lymphadenopathy:      Cervical: No cervical adenopathy  Skin:     General: Skin is warm  Coloration: Skin is not pale  Neurological:      Mental Status: She is alert and oriented to person, place, and time  Cranial Nerves: No cranial nerve deficit  Coordination: Coordination normal    Psychiatric:         Mood and Affect: Mood is anxious  Affect is tearful            Barbara Villalobos MD  [address]

## 2023-03-22 LAB
EXTERNAL HIV SCREEN: NORMAL
HCV AB SER-ACNC: NEGATIVE

## 2023-04-03 ENCOUNTER — HOSPITAL ENCOUNTER (OUTPATIENT)
Dept: ULTRASOUND IMAGING | Facility: MEDICAL CENTER | Age: 48
Discharge: HOME | End: 2023-04-03

## 2023-04-03 DIAGNOSIS — Z80.8 FAMILY HISTORY OF THYROID CANCER: ICD-10-CM

## 2023-10-10 ENCOUNTER — OFFICE VISIT (OUTPATIENT)
Dept: FAMILY MEDICINE CLINIC | Facility: CLINIC | Age: 48
End: 2023-10-10
Payer: COMMERCIAL

## 2023-10-10 VITALS
WEIGHT: 121 LBS | SYSTOLIC BLOOD PRESSURE: 110 MMHG | TEMPERATURE: 98.2 F | HEIGHT: 62 IN | DIASTOLIC BLOOD PRESSURE: 78 MMHG | HEART RATE: 79 BPM | BODY MASS INDEX: 22.26 KG/M2 | RESPIRATION RATE: 16 BRPM | OXYGEN SATURATION: 99 %

## 2023-10-10 DIAGNOSIS — Z80.8 FAMILY HISTORY OF THYROID CANCER: ICD-10-CM

## 2023-10-10 DIAGNOSIS — R59.0 LEFT CERVICAL LYMPHADENOPATHY: Primary | ICD-10-CM

## 2023-10-10 DIAGNOSIS — Z80.51 FAMILY HISTORY OF KIDNEY CANCER: ICD-10-CM

## 2023-10-10 PROCEDURE — 99214 OFFICE O/P EST MOD 30 MIN: CPT | Performed by: INTERNAL MEDICINE

## 2023-10-10 NOTE — PROGRESS NOTES
Assessment/Plan:           Problem List Items Addressed This Visit    None  Visit Diagnoses     Left cervical lymphadenopathy    -  Primary    Relevant Orders    US thyroid    Family history of thyroid cancer        Relevant Orders    US thyroid    Ambulatory Referral to Genetics    Family history of kidney cancer        Relevant Orders     kidney and bladder    Ambulatory Referral to Genetics          Would also consider patient for genetic testing  Subjective:      Patient ID: Kayleen Hernandez is a 53 y.o. female. HPI  Patient is here for an acute visit complaining of left cervical lymphadenopathy that she has noticed about 3 days ago. It is nontender slightly mobile. She reports no upper respiratory infection. She made an appointment to see her dermatologist who concluded it was a lymph node and ordered an ultrasound. Patient is quite nervous as her mom has metastatic bladder cancer. She had a thyroid ultrasound in April of this year which was unremarkable. She wants another ultrasound done. I will be happy to accommodate that. Her father also had kidney cancer in his 45s. He was a smoker. Patient had a CT scan 2013 that did not show any kidney pathology. I would order kidney and bladder ultrasound. The following portions of the patient's history were reviewed and updated as appropriate: allergies, current medications, past family history, past medical history, past social history, past surgical history and problem list.    Review of Systems      Objective:      /78   Pulse 79   Temp 98.2 °F (36.8 °C)   Resp 16   Ht 5' 2" (1.575 m)   Wt 54.9 kg (121 lb)   SpO2 99%   BMI 22.13 kg/m²          Physical Exam  HENT:      Mouth/Throat:      Pharynx: No posterior oropharyngeal erythema. Abdominal:      General: Bowel sounds are normal. There is no distension. Tenderness: There is no abdominal tenderness. There is no guarding.    Lymphadenopathy:      Cervical: Cervical adenopathy present. Neurological:      Mental Status: She is alert.

## 2023-10-11 ENCOUNTER — TELEPHONE (OUTPATIENT)
Dept: HEMATOLOGY ONCOLOGY | Facility: CLINIC | Age: 48
End: 2023-10-11

## 2023-10-11 NOTE — TELEPHONE ENCOUNTER
I spoke with Mitzi to schedule their consultation with Cancer Risk and Genetics. Scheduling Outcome: Patient is scheduled for an appointment on 4/10/24 at 11:00am with Arnetha Castleman. Personal/Family History Related to Appointment:     Personal History of Cancer: Patient reports no personal history of cancer    Family History of Cancer: Patient reports family history of Mother - Thyroid, Metastatic. Father- Kidney cancer  M. Grandmother - Lung     Is patient of [address] [place] [address],  [address]?: No    History of Genetic Testing:  Personal History of Genetic Testing: Patient report no personal history of Genetic Testing. Family History of Genetic Testing: Patient reports that no family members have had Genetic Testing. Progeny:  Is patient able to complete our family history questionnaire on a computer?:  Yes    Patient's preferred e-mail address: [email or web address]

## 2023-10-16 ENCOUNTER — HOSPITAL ENCOUNTER (OUTPATIENT)
Dept: ULTRASOUND IMAGING | Facility: HOSPITAL | Age: 48
Discharge: HOME | End: 2023-10-16
Payer: COMMERCIAL

## 2023-10-16 DIAGNOSIS — R59.0 LEFT CERVICAL LYMPHADENOPATHY: ICD-10-CM

## 2023-10-16 DIAGNOSIS — Z80.8 FAMILY HISTORY OF THYROID CANCER: ICD-10-CM

## 2023-10-16 PROCEDURE — 76536 US EXAM OF HEAD AND NECK: CPT

## 2023-10-18 ENCOUNTER — HOSPITAL ENCOUNTER (OUTPATIENT)
Dept: ULTRASOUND IMAGING | Facility: HOSPITAL | Age: 48
Discharge: HOME | End: 2023-10-18
Payer: COMMERCIAL

## 2023-10-18 DIAGNOSIS — Z80.51 FAMILY HISTORY OF KIDNEY CANCER: ICD-10-CM

## 2023-10-18 PROCEDURE — 76775 US EXAM ABDO BACK WALL LIM: CPT

## 2023-11-01 ENCOUNTER — OFFICE VISIT (OUTPATIENT)
Dept: FAMILY MEDICINE CLINIC | Facility: CLINIC | Age: 48
End: 2023-11-01
Payer: COMMERCIAL

## 2023-11-01 VITALS
OXYGEN SATURATION: 98 % | RESPIRATION RATE: 16 BRPM | DIASTOLIC BLOOD PRESSURE: 72 MMHG | HEIGHT: 62 IN | WEIGHT: 122 LBS | HEART RATE: 73 BPM | SYSTOLIC BLOOD PRESSURE: 118 MMHG | BODY MASS INDEX: 22.45 KG/M2 | TEMPERATURE: 97.6 F

## 2023-11-01 DIAGNOSIS — R59.0 LEFT CERVICAL LYMPHADENOPATHY: Primary | ICD-10-CM

## 2023-11-01 PROCEDURE — 99213 OFFICE O/P EST LOW 20 MIN: CPT | Performed by: INTERNAL MEDICINE

## 2023-11-01 RX ORDER — OLOPATADINE HYDROCHLORIDE 2 MG/ML
1 SOLUTION/ DROPS OPHTHALMIC DAILY
COMMUNITY
Start: 2023-10-26

## 2023-11-01 RX ORDER — LOTEPREDNOL ETABONATE 5 MG/ML
SUSPENSION/ DROPS OPHTHALMIC
COMMUNITY
Start: 2023-10-30

## 2023-11-01 NOTE — PROGRESS NOTES
Assessment/Plan:           Problem List Items Addressed This Visit    None  Visit Diagnoses       Left cervical lymphadenopathy    -  Primary    Relevant Orders    Leukemia/Lymphoma flow cytometry              Subjective:      Patient ID: Gloria Pardo is a 53 y.o. female. HPI  Follow-up on recent testing. Thyroid ultrasound is unremarkable. Soft tissue did show lymphadenopathy but without any concerning features. It was felt to be reactive. Patient has an upper respiratory started few days ago with nasal congestion pressure in the right ear. Also to note kidney and bladder ultrasound was unremarkable. Please see my last note for details. This point patient was reassured of her testing. Expected recovery from her upper respiratory infection. Otherwise, patient does not have any type B symptoms features. Patient has been contacted by genetic testing team and has an appointment for April. I would also order flow cytometry in the meanwhile. The following portions of the patient's history were reviewed and updated as appropriate: allergies, current medications, past family history, past medical history, past social history, past surgical history, and problem list.    Review of Systems      Objective:      /72 (BP Location: Left arm, Patient Position: Sitting, Cuff Size: Standard)   Pulse 73   Temp 97.6 °F (36.4 °C) (Tympanic)   Resp 16   Ht 5' 2" (1.575 m)   Wt 55.3 kg (122 lb)   SpO2 98%   BMI 22.31 kg/m²          Physical Exam  Lymphadenopathy:      Cervical: Cervical adenopathy (Small reactive lymphadenopathy especially left posterior) present.

## 2024-03-18 ENCOUNTER — OFFICE VISIT (OUTPATIENT)
Dept: FAMILY MEDICINE CLINIC | Facility: CLINIC | Age: 49
End: 2024-03-18
Payer: COMMERCIAL

## 2024-03-18 VITALS
TEMPERATURE: 97.1 F | WEIGHT: 121.8 LBS | BODY MASS INDEX: 22.41 KG/M2 | HEIGHT: 62 IN | SYSTOLIC BLOOD PRESSURE: 110 MMHG | DIASTOLIC BLOOD PRESSURE: 70 MMHG | RESPIRATION RATE: 14 BRPM | OXYGEN SATURATION: 99 % | HEART RATE: 60 BPM

## 2024-03-18 DIAGNOSIS — Z00.00 ANNUAL PHYSICAL EXAM: Primary | ICD-10-CM

## 2024-03-18 DIAGNOSIS — D56.3 ALPHA THALASSEMIA MINOR: ICD-10-CM

## 2024-03-18 DIAGNOSIS — R59.0 LEFT CERVICAL LYMPHADENOPATHY: ICD-10-CM

## 2024-03-18 DIAGNOSIS — Z13.220 NEED FOR LIPID SCREENING: ICD-10-CM

## 2024-03-18 DIAGNOSIS — Z13.89 SCREENING FOR GENITOURINARY CONDITION: ICD-10-CM

## 2024-03-18 DIAGNOSIS — Z13.29 SCREENING FOR THYROID DISORDER: ICD-10-CM

## 2024-03-18 DIAGNOSIS — Z13.1 SCREENING FOR DIABETES MELLITUS: ICD-10-CM

## 2024-03-18 DIAGNOSIS — E55.9 VITAMIN D DEFICIENCY: ICD-10-CM

## 2024-03-18 DIAGNOSIS — Z80.8 FAMILY HISTORY OF THYROID CANCER: ICD-10-CM

## 2024-03-18 PROCEDURE — 99396 PREV VISIT EST AGE 40-64: CPT | Performed by: INTERNAL MEDICINE

## 2024-03-18 RX ORDER — CARBOXYMETHYLCELLULOSE SODIUM, GLYCERIN AND POLYSORBATE 80 5; 10; 5 MG/ML; MG/ML; MG/ML
SOLUTION/ DROPS OPHTHALMIC AS NEEDED
COMMUNITY

## 2024-03-18 NOTE — PROGRESS NOTES
ADULT ANNUAL PHYSICAL  [facility] - [address] PRIMARY CARE [facility]    NAME: Татьяна Olivas  AGE: 48 y.o. SEX: female  : 1975     DATE: 3/18/2024     Assessment and Plan:     Problem List Items Addressed This Visit       Alpha thalassemia minor    Relevant Orders    CBC and differential    Iron Panel (Includes Ferritin, Iron Sat%, Iron, and TIBC)    Vitamin D deficiency    Relevant Orders    Vitamin D 25 hydroxy     Other Visit Diagnoses       Annual physical exam    -  Primary    Left cervical lymphadenopathy        Relevant Orders    Leukemia/Lymphoma flow cytometry    Family history of thyroid cancer        Screening for thyroid disorder        Relevant Orders    TSH, 3rd generation with Free T4 reflex    Screening for diabetes mellitus        Relevant Orders    CBC and differential    Comprehensive metabolic panel    Need for lipid screening        Relevant Orders    Lipid panel    Screening for genitourinary condition        Relevant Orders    Urinalysis with microscopic              Immunizations and preventive care screenings were discussed with patient today. Appropriate education was printed on patient's after visit summary.    Counseling:  Patient is here for annual physical.  She has persistent left side cervical lymphadenopathy.  She has been seen by ENT last November and clinical observation was discussed and agreed upon.  Patient will follow-up with him.  She does not want to do a CT of the neck at this time.  She will be meeting with genetics specialist early next month.  Family history of metastatic thyroid cancer.  Patient's thyroid ultrasound was negative and of last year.  Lab studies are due.  Mammogram is scheduled.           No follow-ups on file.     Chief Complaint:     Chief Complaint   Patient presents with    Annual Exam     Mammogram due  no order needed per patient she gets order from OBGYN.      History of Present Illness:      Adult Annual Physical   Patient here for a comprehensive physical exam. The patient reports problems - see above .    Diet and Physical Activity  Diet/Nutrition: well balanced diet.   Exercise: walking.      Depression Screening  PHQ-2/9 Depression Screening           General Health  Sleep: sleeps well.   Hearing: normal - bilateral.  Vision: no vision problems.   Dental: brushes teeth twice daily.       /GYN Health  Follows with gynecology? yes   Patient is: postmenopausal  Last menstrual period: 3 years ago  Contraceptive method:  None .    Advanced Care Planning  Do you have an advanced directive? no  Do you have a durable medical power of ? yes  ACP document given to the patient? no     Review of Systems:     Review of Systems   Past Medical History:     Past Medical History:   Diagnosis Date    Alpha thalassemia 2     Anemia     Chronic fatigue     last assessed: 2017    Depression     last assessed: 2015    Esophageal reflux     Globus sensation     last assessed: 2016    Hypochromic anemia     Microcytic thalassemia    Leukocytosis     last assessed: 2/3/2017    Palpitations     resolved: 2017    Thalassemia     Trouble swallowing     Vitamin D deficiency       Past Surgical History:     Past Surgical History:   Procedure Laterality Date     SECTION      COLONOSCOPY      CO COLONOSCOPY FLX DX W/COLLJ SPEC WHEN PFRMD N/A 2016    Procedure: EGD AND COLONOSCOPY;  Surgeon: Yanet Encarnacion DO;  Location: [facility] GI LAB;  Service: Gastroenterology    UPPER GASTROINTESTINAL ENDOSCOPY        Social History:     Social History     Socioeconomic History    Marital status: /Civil Union     Spouse name: None    Number of children: None    Years of education: None    Highest education level: None   Occupational History    None   Tobacco Use    Smoking status: Former    Smokeless tobacco: Former     Quit date: 1999    Tobacco comments:     never smoker ( as per  allscripts)   Vaping Use    Vaping status: Never Used   Substance and Sexual Activity    Alcohol use: No    Drug use: No    Sexual activity: Yes     Partners: Male   Other Topics Concern    None   Social History Narrative    None     Social Determinants of Health     Financial Resource Strain: Not on file   Food Insecurity: Not on file   Transportation Needs: Not on file   Physical Activity: Not on file   Stress: Not on file   Social Connections: Not on file   Intimate Partner Violence: Not on file   Housing Stability: Not on file      Family History:     Family History   Problem Relation Age of Onset    Diabetes Family         mellitus    Kidney cancer Family     Diabetes Mother         mellitus    Thyroid cancer Mother     Kidney cancer Father         primary, with metastasis from kidney to other site    Stroke Father       Current Medications:     Current Outpatient Medications   Medication Sig Dispense Refill    Carboxymeth-Glyc-Polysorb PF (Refresh Optive Gigi-3) 0.5-1-0.5 % SOLN Apply to eye if needed      cholecalciferol (VITAMIN D3) 1,000 units tablet Take 1,000 Units by mouth daily (Patient not taking: Reported on 9/15/2022)      loteprednol etabonate (LOTEMAX) 0.5 % ophthalmic suspension  (Patient not taking: Reported on 3/18/2024)      Multiple Vitamins-Minerals (MULTIVITAMIN ADULT EXTRA C PO) Take by mouth (Patient not taking: Reported on 3/15/2023)      multivitamin (THERAGRAN) TABS Take 1 tablet by mouth daily (Patient not taking: Reported on 10/10/2023)      olopatadine HCl (PATADAY) 0.2 % opth drops Apply 1 drop to eye daily (Patient not taking: Reported on 3/18/2024)      Sutab 8924-838-433 MG TABS USE AS DIRECTED BIN: [id number] / PCN: [id number] / GROUP: [id number] / MEMBER ID: [id number] (Patient not taking: Reported on 10/10/2023)       No current facility-administered medications for this visit.      Allergies:     Allergies   Allergen Reactions    Famotidine Diarrhea     Category: Allergy;     Zoloft  "[Sertraline Hcl]       Physical Exam:     /70 (BP Location: Left arm, Patient Position: Sitting, Cuff Size: Adult)   Pulse 60   Temp (!) 97.1 °F (36.2 °C)   Resp 14   Ht 5' 2\" (1.575 m)   Wt 55.2 kg (121 lb 12.8 oz)   SpO2 99%   BMI 22.28 kg/m²     Physical Exam  Constitutional:       General: She is not in acute distress.     Appearance: She is not diaphoretic.   HENT:      Mouth/Throat:      Pharynx: No posterior oropharyngeal erythema.   Eyes:      General: No scleral icterus.     Conjunctiva/sclera: Conjunctivae normal.   Neck:      Thyroid: No thyromegaly.      Vascular: No carotid bruit.   Cardiovascular:      Rate and Rhythm: Normal rate and regular rhythm.      Heart sounds: Normal heart sounds. No murmur heard.  Pulmonary:      Effort: Pulmonary effort is normal. No respiratory distress.      Breath sounds: Normal breath sounds. No stridor. No wheezing or rales.   Abdominal:      General: Bowel sounds are normal. There is no distension.      Palpations: Abdomen is soft. There is no mass.      Tenderness: There is no abdominal tenderness. There is no right CVA tenderness, left CVA tenderness or guarding.   Musculoskeletal:      Right lower leg: No edema.      Left lower leg: No edema.   Lymphadenopathy:      Cervical: Cervical adenopathy (Left posterior neck cervical lymphadenopathyodes) present.   Skin:     General: Skin is warm.   Neurological:      Mental Status: She is alert and oriented to person, place, and time.      Cranial Nerves: No cranial nerve deficit.      Coordination: Coordination normal.   Psychiatric:         Mood and Affect: Mood normal.         Behavior: Behavior normal.          Zee Maddox MD  [facility] PRIMARY CARE [facility]"

## 2024-03-19 ENCOUNTER — TELEPHONE (OUTPATIENT)
Age: 49
End: 2024-03-19

## 2024-03-19 LAB
25(OH)D3+25(OH)D2 SERPL-MCNC: 26 NG/ML (ref 30–100)
ALBUMIN SERPL-MCNC: 4.4 G/DL (ref 3.5–5.7)
ALP SERPL-CCNC: 54 U/L (ref 35–120)
ALT SERPL-CCNC: 12 U/L
ANION GAP SERPL CALCULATED.3IONS-SCNC: 9 MMOL/L (ref 3–11)
ANISOCYTOSIS BLD QL SMEAR: ABNORMAL
AST SERPL-CCNC: 14 U/L
BASOPHILS # BLD AUTO: 0.1 THOU/CMM (ref 0–0.1)
BASOPHILS NFR BLD AUTO: 2 %
BILIRUB SERPL-MCNC: 0.9 MG/DL (ref 0.2–1)
BUN SERPL-MCNC: 11 MG/DL (ref 7–25)
CALCIUM SERPL-MCNC: 9.1 MG/DL (ref 8.5–10.1)
CHLORIDE SERPL-SCNC: 105 MMOL/L (ref 100–109)
CHOLEST SERPL-MCNC: 182 MG/DL
CHOLEST/HDLC SERPL: 3 {RATIO}
CO2 SERPL-SCNC: 27 MMOL/L (ref 21–31)
CREAT SERPL-MCNC: 0.64 MG/DL (ref 0.4–1.1)
CYTOLOGY CMNT CVX/VAG CYTO-IMP: NORMAL
DIFFERENTIAL METHOD BLD: ABNORMAL
EOSINOPHIL # BLD AUTO: 0.3 THOU/CMM (ref 0–0.5)
EOSINOPHIL NFR BLD AUTO: 4 %
ERYTHROCYTE [DISTWIDTH] IN BLOOD BY AUTOMATED COUNT: 16.2 % (ref 12–16)
FERRITIN SERPL-MCNC: 76 NG/ML (ref 11–306.8)
GFR/BSA.PRED SERPLBLD CYS-BASED-ARV: 109 ML/MIN/{1.73_M2}
GLUCOSE SERPL-MCNC: 86 MG/DL (ref 65–99)
HCT VFR BLD AUTO: 36 % (ref 35–43)
HDLC SERPL-MCNC: 61 MG/DL (ref 23–92)
HGB BLD-MCNC: 11.5 G/DL (ref 11.5–14.5)
IRON SATN MFR SERPL: 25 % (ref 20–50)
IRON SERPL-MCNC: 88 UG/DL (ref 50–212)
LDLC SERPL CALC-MCNC: 109 MG/DL
LYMPHOCYTES # BLD AUTO: 1.9 THOU/CMM (ref 1–3)
LYMPHOCYTES NFR BLD AUTO: 26 %
MCH RBC QN AUTO: 19.7 PG (ref 26–34)
MCHC RBC AUTO-ENTMCNC: 32 G/DL (ref 32–37)
MCV RBC AUTO: 62 FL (ref 80–100)
MICROCYTES BLD QL SMEAR: SLIGHT
MONOCYTES # BLD AUTO: 0.5 THOU/CMM (ref 0.3–1)
MONOCYTES NFR BLD AUTO: 8 %
MORPHOLOGY BLD-IMP: ABNORMAL
NEUTROPHILS # BLD AUTO: 4.3 THOU/CMM (ref 1.8–7.8)
NEUTROPHILS NFR BLD AUTO: 60 %
NONHDLC SERPL-MCNC: 121 MG/DL
OVALOCYTES BLD QL SMEAR: ABNORMAL
PLATELET # BLD AUTO: 207 THOU/CMM (ref 140–350)
PMV BLD REES-ECKER: 9.9 FL (ref 7.5–11.3)
POIKILOCYTOSIS BLD QL SMEAR: ABNORMAL
POLYCHROMASIA BLD QL SMEAR: ABNORMAL
POTASSIUM SERPL-SCNC: 4.2 MMOL/L (ref 3.5–5.2)
PROT SERPL-MCNC: 6.3 G/DL (ref 6.3–8.3)
RBC # BLD AUTO: 5.85 MILL/CMM (ref 3.7–4.7)
SODIUM SERPL-SCNC: 141 MMOL/L (ref 135–145)
TIBC SERPL-MCNC: 354 UG/DL (ref 260–430)
TRANSFERRIN SERPL-MCNC: 253 MG/DL (ref 203–362)
TRIGL SERPL-MCNC: 60 MG/DL
TSH SERPL-ACNC: 1.79 UIU/ML (ref 0.45–5.33)
WBC # BLD AUTO: 7.1 THOU/CMM (ref 4–10)

## 2024-03-19 NOTE — TELEPHONE ENCOUNTER
Yani from [facility] aware that Dr. Maddox wanted this test drawn as blood work and not drawn from cerebral spinal fluid per Rosa Maria warm transfer

## 2024-03-19 NOTE — TELEPHONE ENCOUNTER
"Call from Yani, [facility] labs in [place]. Seeking clarification for lab order for Leukemia/Lymphoma flow cytometry. The bottom of the script says, \"CSF Test Priority ((Sequentially number all CSF tests. 1-first, 2-second, etc): 1\" ; she wants to know if this needs to be done as it's not done at [facility] labs. Warm transfer to office to Veronica.Yani was informed that the CSF needs to be done. Yani stated they had drawn the blood but CSF could not be done in an office setting; it has to be done in a hospital. She would like a call back as soon as possible as to what they should do with the blood that has been drawn. Please call her at [phone].  "

## 2024-03-20 LAB
GLUCOSE UR QL STRIP: NEGATIVE MG/DL
HGB UR QL STRIP: NEGATIVE MG/DL
IMMUNOPHENOTYPING STUDY: NORMAL
KETONES UR QL STRIP: NEGATIVE MG/DL
LEUKOCYTE ESTERASE UR QL STRIP: NEGATIVE /UL
NITRITE UR QL STRIP: NEGATIVE
PH UR: 7 [PH] (ref 4.5–8)
PROT 24H UR-MRATE: NEGATIVE MG/DL
RBC #/AREA URNS HPF: NORMAL /HPF (ref 0–2)
SL AMB POCT URINE COMMENT: NORMAL
SP GR UR: 1.01 (ref 1–1.03)
SQUAMOUS #/AREA URNS HPF: NORMAL /LPF (ref 0–5)
WBC #/AREA URNS HPF: 0 /HPF (ref 0–5)

## 2024-03-22 ENCOUNTER — TELEPHONE (OUTPATIENT)
Age: 49
End: 2024-03-22

## 2024-03-22 NOTE — TELEPHONE ENCOUNTER
Pt called in and wanted to leave a note in her chart of the previous doctor that performed a colonoscopy.   Dr. Jada See [phone]

## 2024-03-27 ENCOUNTER — TELEPHONE (OUTPATIENT)
Age: 49
End: 2024-03-27

## 2024-04-05 ENCOUNTER — TELEPHONE (OUTPATIENT)
Dept: GENETICS | Facility: CLINIC | Age: 49
End: 2024-04-05

## 2024-04-05 ENCOUNTER — TELEPHONE (OUTPATIENT)
Age: 49
End: 2024-04-05

## 2024-04-05 DIAGNOSIS — R59.0 LEFT CERVICAL LYMPHADENOPATHY: Primary | ICD-10-CM

## 2024-04-05 NOTE — TELEPHONE ENCOUNTER
Please review documented message, is appointment needed? I don't see she is scheduled with ENT unless she is scheduled out of network.

## 2024-04-05 NOTE — TELEPHONE ENCOUNTER
Patient states that she can't get in for an appointment with ENT for 2 months and is requesting to have another US of her neck done and states that she wants the whole neck area since another lump was found on her other side of her neck. Please advise.

## 2024-04-10 ENCOUNTER — CLINICAL SUPPORT (OUTPATIENT)
Dept: GENETICS | Facility: CLINIC | Age: 49
End: 2024-04-10
Payer: COMMERCIAL

## 2024-04-10 ENCOUNTER — DOCUMENTATION (OUTPATIENT)
Dept: GENETICS | Facility: CLINIC | Age: 49
End: 2024-04-10

## 2024-04-10 DIAGNOSIS — Z80.51 FAMILY HISTORY OF KIDNEY CANCER: ICD-10-CM

## 2024-04-10 DIAGNOSIS — Z80.8 FAMILY HISTORY OF THYROID CANCER: ICD-10-CM

## 2024-04-10 PROCEDURE — 36415 COLL VENOUS BLD VENIPUNCTURE: CPT

## 2024-04-10 NOTE — PROGRESS NOTES
Pre-Test Genetic Counseling Consult Note    Patient Name: Татьяна Olivas   /Age: 1975/48 y.o.  Referring Provider:  Zee Maddox MD    Date of Service: 4/10/2024  Genetic Counselor: Dolores Giron MS, [facility]  Interpretation Services: None  Location: In-person consult at [place]  Length of Visit: 60 Minutes    Georgia was referred to the [facility] Cancer Risk and Genetic Assessment Program due to her family history of kidney, pancreatic, and thyroid cancer . she presents today to discuss the possibility of a hereditary cancer syndrome, options for genetic testing, and implications for her and her family.     Cancer History and Treatment:   Personal History:   Oncology History    No history exists.       Screening Hx:   Breast:  Breast Imaging:   Mammogram (May 2023):   Impression:  No persistent asymmetry. No mammographic evidence of malignancy   Breast Density: Heterogeneously dense    MRI Breast Bilateral (2023):   Probably benign focal area of non-mass enhancement in the left breast as described above, stable to slightly decreased in prominence since 2022. Recommend continued 6-month follow-up MRI.     US Breast Bilateral (2023):   Impression: Recommend 6-month follow-up with both Right breast targeted ultrasound and   Left breast targeted ultrasound.     Breast Biopsy: none     Colon:  Colonoscopy (): 0 polyps reported  F/u recommended in 5 years     Gynecologic:  Ovaries and Uterus intact     US Pelvis Complete (): [facility]    Skin:  Sees derm annually    Other screening:   US Thyroid ():   Impression: normal examination     US Head and Neck ():   Impression: Nonspecific lymph nodes without suspicious features, possibly reactive.     US Kidney and Bladder ():   Impression: Normal    EGD ():   Impression: Normal esophagus, GE Junction; Bile reflux; Normal 1st and 2nd portion of the duodenum     Reproductive History  Age at menarche: 12  Age at first  "live birth:  34  Menopause: Post Menopausal (45)  Hormone replacement: none     Medical and Surgical History  Pertinent surgical history:   Past Surgical History:   Procedure Laterality Date     SECTION      COLONOSCOPY      CA COLONOSCOPY FLX DX W/COLLJ SPEC WHEN PFRMD N/A 2016    Procedure: EGD AND COLONOSCOPY;  Surgeon: Yanet Encarnacion DO;  Location: [facility] GI LAB;  Service: Gastroenterology    UPPER GASTROINTESTINAL ENDOSCOPY        Pertinent medical history:  Past Medical History:   Diagnosis Date    Alpha thalassemia 2     Anemia     Chronic fatigue     last assessed: 2017    Depression     last assessed: 2015    Esophageal reflux     Globus sensation     last assessed: 2016    Hypochromic anemia     Microcytic thalassemia    Leukocytosis     last assessed: 2/3/2017    Palpitations     resolved: 2017    Thalassemia     Trouble swallowing     Vitamin D deficiency          Other History:  Height:   Ht Readings from Last 1 Encounters:   24 5' 2\" (1.575 m)     Weight:   Wt Readings from Last 1 Encounters:   24 55.2 kg (121 lb 12.8 oz)       Relevant Family History   Patient reports non-Ashkenazi Religious ancestry.     Maternal Family History:  Mother: papillary thyroid cancer diagnosed at 65, s/p thyroidectomy; recurrence at 70 w/ mets- RT; smoker (currently 70 y/o)  Grandmother: lung cancer diagnosed at 80, non-smoker (d.82)   Great-uncle (grandmother's side): lung cancer diagnosed at 85, non-smoker (d.90)     Paternal Family History:  Father: unilateral kidney cancer diagnosed at ~50, unknown type, s/p unilateral nephrectomy; former smoker (d.76- COPD)   Aunt: pancreatic cancer diagnosed at 80 (d.80)     Please refer to the scanned pedigree in the Media Tab for a complete family history     *All history is reported as provided by the patient; records are not available for review, except where indicated.     Assessment:  We discussed sporadic, familial and hereditary " cancer.  We reviewed that only 5-10% of cancers are considered hereditary. Cancers such as lung cancer, cervical cancer, testicular cancer, and liver cancer very rarely have a hereditary etiology, and we cannot test for a genetic predisposition for these cancers at this time.  We also discussed the many factors that influence our risk for cancer such as age, environmental exposures, lifestyle choices and family history.      We reviewed the indications suggestive of a hereditary predisposition to cancer.    Georgia is unaffected, but is considering genetic testing due to a family history of kidney and pancreatic cancer. Although Georgia's father is the most informative person to undergo genetic testing, Georgia can consider genetic testing due to the following:   Patient does not have cancer but is the most informative person to test because their father is   .    Although the personal and family history of cancer is not consistent with the typical presentation of a hereditary cancer syndrome, genetic testing may be considered to rule out moderately penetrant genes which have clear management recommendations.      The risks, benefits, and limitations of genetic testing were reviewed with the patient, as well as genetic discrimination laws, and possible test results (positive, negative, variants of uncertain significance) and their clinical implications. If positive for a mutation, options for managing cancer risk including increased surveillance, chemoprevention, and in some cases prophylactic surgery were discussed.     Georgia was informed that if a hereditary cancer syndrome was identified in her, first degree relatives (parents, siblings, and children) have a chance of also inheriting the condition. Genetic testing would allow for predictive genetic testing in other relatives, who may also be at risk depending on their degree of relation.     Georgia mentioned that she recently is interested in learning  information about her ancestry. [employer] does report ancestry for participants. We reviewed that [employer] is a population based genetic screening initiative that reports out pathogenic variants in genes that cause HBOC syndrome, Dwyer syndrome, and Familial Hypercholesterolemia only.  Although there is some overlap between the [employer] and the [employer] CustomNext Cancer panel, the latter is more comprehensive from a hereditary cancer standpoint and analyzes a larger number of cancer susceptibility genes. Negative [employer] does not guarantee the CustomNext Cancer results will return negative as well.      Georgia mentioned that her 15 y/o son had precocious puberty (8 y/o) and gynecomastia which developed recently.  We informed Georgia that [facility] not have any geneticists in the network that are able to order genetic testing beyond the prenatal and hereditary cancer scope. We reviewed the differences between karyotype, microarray, and next-generation sequencing. There are geneticists in the [facility], [facility], and [facility]. Georgia mentioned that her son followed up with an endocrinologist at [facility] but is unsure if genetic testing is completed.     Billing:  Most individuals pay <$100 for hereditary cancer genetic testing. If insurance covers the cost of the testing, individuals may still pay out of pocket secondary to co-pays, co-insurance, or deductibles. If the cost of the testing exceeds $100, the lab will reach out to the patient via phone or e-mail. The patient will then have the option to proceed with the testing, cancel the testing, or elect the self-pay option of $250. Georgia verbalized understanding.     Plan: Patient decided to proceed with testing and provided consent.    Summary:     Sample Collection:  The patient's blood sample was drawn in the office on 4/10/24 by genetic counseling assistant Michael Estrada    Genetic Testing Preformed: CustomNext: Cancer®  +Surprise Ride® (59 genes): APC, [place], AXIN2, BAP1, BARD1, BMPR1A, BRCA1, BRCA2, BRIP1, CDH1, CDK4, CDKN1B, CDKN2A, CHEK2, CTNNA1, DICER1, EGLN1, EPCAM, FH, FLCN, GREM1, HOXB13, KIF1B, KIT, MAX, MEN1, MET, MITF, MLH1, MSH2, MSH3, MSH6, MUTYH, NF1, NTHL1, PALB2, PDGFRA PMS2, POLD1, POLE, POT1, PTEN, RAD51C, RAD51D, RB1, RET, SDHA, SDHAF2, SDHB, SDHC, SDHD, SMAD4, SMARCA4, STK11, [id number], TP53, TSC1, TSC2, VHL      Result Call Information:  In the event that we need to reach Georgia via telephone:  I confirmed the patient's mobile number on file as the best number to call with results  I confirmed with the patient that we can leave a voicemail on her mobile number    Results take approximately 2-3 weeks to complete once test is started.    Georgia will be notified via [employer] once results are available.      Additional recommendations for surveillance/medical management will be made pending genetic test results.

## 2024-04-10 NOTE — LETTER
April 10, 2024     Zee Maddox MD  [address]  [address]  [facility] [place] [address]    Patient: Татьяна Olivas  YOB: 1975  Date of Visit: 4/10/2024      Dear Dr. Maddox:    Thank you for referring Татьяна Olivas to me for evaluation. Below are my notes for this consultation.    If you have questions, please do not hesitate to call me. I look forward to following your patient along with you.         Sincerely,        Dolores Giron        CC: No Recipients        Pre-Test Genetic Counseling Consult Note    Patient Name: Татьяна Olivas   /Age: 1975/48 y.o.  Referring Provider:  Zee Maddox MD    Date of Service: 4/10/2024  Genetic Counselor: Dolores Giron [place] [facility] [place]pretation Services: None  Location: In-person consult at [place]  Length of Visit: 60 Minutes    Georgia was referred to the [facility] Cancer Risk and Genetic Assessment Program due to her family history of kidney, pancreatic, and thyroid cancer . she presents today to discuss the possibility of a hereditary cancer syndrome, options for genetic testing, and implications for her and her family.     Cancer History and Treatment:   Personal History:   Oncology History    No history exists.       Screening Hx:   Breast:  Breast Imaging:   Mammogram (May 2023):   Impression:  No persistent asymmetry. No mammographic evidence of malignancy   Breast Density: Heterogeneously dense    MRI Breast Bilateral (2023):   Probably benign focal area of non-mass enhancement in the left breast as described above, stable to slightly decreased in prominence since 2022. Recommend continued 6-month follow-up MRI.     US Breast Bilateral (2023):   Impression: Recommend 6-month follow-up with both Right breast targeted ultrasound and   Left breast targeted ultrasound.     Breast Biopsy: none     Colon:  Colonoscopy (): 0 polyps reported  F/u recommended in 5 years     Gynecologic:  Ovaries and Uterus intact     US Pelvis  "Complete (): completed at [facility]    Skin:  Sees derm annually    Other screening:   US Thyroid ():   Impression: normal examination     US Head and Neck ():   Impression: Nonspecific lymph nodes without suspicious features, possibly reactive.     US Kidney and Bladder ():   Impression: Normal    EGD ():   Impression: Normal esophagus, GE Junction; Bile reflux; Normal 1st and 2nd portion of the duodenum     Reproductive History  Age at menarche: 12  Age at first live birth:  34  Menopause: Post Menopausal (45)  Hormone replacement: none     Medical and Surgical History  Pertinent surgical history:   Past Surgical History:   Procedure Laterality Date   •  SECTION     • COLONOSCOPY     • WI COLONOSCOPY FLX DX W/COLLJ SPEC WHEN PFRMD N/A 2016    Procedure: EGD AND COLONOSCOPY;  Surgeon: Yanet Encarnacion DO;  Location: [facility];  Service: Gastroenterology   • UPPER GASTROINTESTINAL ENDOSCOPY        Pertinent medical history:  Past Medical History:   Diagnosis Date   • Alpha thalassemia 2    • Anemia    • Chronic fatigue     last assessed: 2017   • Depression     last assessed: 2015   • Esophageal reflux    • Globus sensation     last assessed: 2016   • Hypochromic anemia     Microcytic thalassemia   • Leukocytosis     last assessed: 2/3/2017   • Palpitations     resolved: 2017   • Thalassemia    • Trouble swallowing    • Vitamin D deficiency          Other History:  Height:   Ht Readings from Last 1 Encounters:   24 5' 2\" (1.575 m)     Weight:   Wt Readings from Last 1 Encounters:   24 55.2 kg (121 lb 12.8 oz)       Relevant Family History   Patient reports non-Ashkenazi Lutheran ancestry.     Maternal Family History:  Mother: papillary thyroid cancer diagnosed at 65, s/p thyroidectomy; recurrence at 70 w/ mets- RT; smoker (currently 70 y/o)  Grandmother: lung cancer diagnosed at 80, non-smoker (d.82)   Great-uncle (grandmother's side): lung cancer " diagnosed at 85, non-smoker (d.90)     Paternal Family History:  Father: unilateral kidney cancer diagnosed at ~50, unknown type, s/p unilateral nephrectomy; former smoker (d.76- COPD)   Aunt: pancreatic cancer diagnosed at 80 (d.80)     Please refer to the scanned pedigree in the Media Tab for a complete family history     *All history is reported as provided by the patient; records are not available for review, except where indicated.     Assessment:  We discussed sporadic, familial and hereditary cancer.  We reviewed that only 5-10% of cancers are considered hereditary. Cancers such as lung cancer, cervical cancer, testicular cancer, and liver cancer very rarely have a hereditary etiology, and we cannot test for a genetic predisposition for these cancers at this time.  We also discussed the many factors that influence our risk for cancer such as age, environmental exposures, lifestyle choices and family history.      We reviewed the indications suggestive of a hereditary predisposition to cancer.    Georgia is unaffected, but is considering genetic testing due to a family history of kidney and pancreatic cancer. Although Georgia's father is the most informative person to undergo genetic testing, Georgia can consider genetic testing due to the following:   Patient does not have cancer but is the most informative person to test because their father is   .    Although the personal and family history of cancer is not consistent with the typical presentation of a hereditary cancer syndrome, genetic testing may be considered to rule out moderately penetrant genes which have clear management recommendations.      The risks, benefits, and limitations of genetic testing were reviewed with the patient, as well as genetic discrimination laws, and possible test results (positive, negative, variants of uncertain significance) and their clinical implications. If positive for a mutation, options for managing cancer risk  including increased surveillance, chemoprevention, and in some cases prophylactic surgery were discussed.     Georgia was informed that if a hereditary cancer syndrome was identified in her, first degree relatives (parents, siblings, and children) have a chance of also inheriting the condition. Genetic testing would allow for predictive genetic testing in other relatives, who may also be at risk depending on their degree of relation.     Georgia mentioned that she recently is interested in learning information about her ancestry. [employer] does report ancestry for participants. We reviewed that [employer] is a population based genetic screening initiative that reports out pathogenic variants in genes that cause HBOC syndrome, Dwyer syndrome, and Familial Hypercholesterolemia only.  Although there is some overlap between the [employer] and the [employer] CustomNext Cancer panel, the latter is more comprehensive from a hereditary cancer standpoint and analyzes a larger number of cancer susceptibility genes. Negative [employer] does not guarantee the CustomNext Cancer results will return negative as well.      Georgia mentioned that her 15 y/o son had precocious puberty (10 y/o) and gynecomastia which developed recently.  We informed Georgia that [facility] does not have any geneticists in the network that are able to order genetic testing beyond the prenatal and hereditary cancer scope. We reviewed the differences between karyotype, microarray, and next-generation sequencing. There are geneticists in the [facility], [facility], and [facility] systems. Georgia mentioned that her son followed up with an endocrinologist at [facility] but is unsure if genetic testing is completed.     Billing:  Most individuals pay <$100 for hereditary cancer genetic testing. If insurance covers the cost of the testing, individuals may still pay out of pocket secondary to co-pays, co-insurance, or deductibles. If the cost of the testing  exceeds $100, the lab will reach out to the patient via phone or e-mail. The patient will then have the option to proceed with the testing, cancel the testing, or elect the self-pay option of $250. Georgia verbalized understanding.     Plan: Patient decided to proceed with testing and provided consent.    Summary:     Sample Collection:  The patient's blood sample was drawn in the office on 4/10/24 by genetic counseling assistant Michael Estrada    Genetic Testing Preformed: CustomNext: Cancer® +RNAinsight® (59 genes): APC, CAROLINA, AXIN2, BAP1, BARD1, BMPR1A, BRCA1, BRCA2, BRIP1, CDH1, CDK4, CDKN1B, CDKN2A, CHEK2, CTNNA1, DICER1, EGLN1, EPCAM, FH, FLCN, GREM1, HOXB13, KIF1B, KIT, MAX, MEN1, MET, MITF, MLH1, MSH2, MSH3, MSH6, MUTYH, NF1, NTHL1, PALB2, PDGFRA PMS2, POLD1, POLE, POT1, PTEN, RAD51C, RAD51D, RB1, RET, SDHA, SDHAF2, SDHB, SDHC, SDHD, SMAD4, SMARCA4, STK11, AHVH146, TP53, TSC1, TSC2, VHL      Result Call Information:  In the event that we need to reach Georgia via telephone:  I confirmed the patient's mobile number on file as the best number to call with results  I confirmed with the patient that we can leave a voicemail on her mobile number    Results take approximately 2-3 weeks to complete once test is started.    Georgia will be notified via [employer] once results are available.      Additional recommendations for surveillance/medical management will be made pending genetic test results.

## 2024-06-12 ENCOUNTER — TELEPHONE (OUTPATIENT)
Dept: ENDOCRINOLOGY | Facility: CLINIC | Age: 49
End: 2024-06-12

## 2025-03-19 ENCOUNTER — RA CDI HCC (OUTPATIENT)
Dept: OTHER | Facility: HOSPITAL | Age: 50
End: 2025-03-19

## 2025-03-20 ENCOUNTER — OFFICE VISIT (OUTPATIENT)
Age: 50
End: 2025-03-20
Payer: COMMERCIAL

## 2025-03-20 VITALS
DIASTOLIC BLOOD PRESSURE: 64 MMHG | TEMPERATURE: 97.6 F | OXYGEN SATURATION: 98 % | HEART RATE: 64 BPM | SYSTOLIC BLOOD PRESSURE: 98 MMHG | HEIGHT: 62 IN | WEIGHT: 123 LBS | BODY MASS INDEX: 22.63 KG/M2

## 2025-03-20 DIAGNOSIS — Z13.220 NEED FOR LIPID SCREENING: ICD-10-CM

## 2025-03-20 DIAGNOSIS — I73.9 PAD (PERIPHERAL ARTERY DISEASE) (HCC): ICD-10-CM

## 2025-03-20 DIAGNOSIS — Z80.8 FAMILY HISTORY OF THYROID CANCER: ICD-10-CM

## 2025-03-20 DIAGNOSIS — D56.3 ALPHA THALASSEMIA MINOR: ICD-10-CM

## 2025-03-20 DIAGNOSIS — Z13.1 SCREENING FOR DIABETES MELLITUS: ICD-10-CM

## 2025-03-20 DIAGNOSIS — Z13.89 SCREENING FOR GENITOURINARY CONDITION: ICD-10-CM

## 2025-03-20 DIAGNOSIS — Z13.29 SCREENING FOR THYROID DISORDER: ICD-10-CM

## 2025-03-20 DIAGNOSIS — Z00.00 ANNUAL PHYSICAL EXAM: Primary | ICD-10-CM

## 2025-03-20 DIAGNOSIS — R22.1 MASS OF RIGHT SIDE OF NECK: ICD-10-CM

## 2025-03-20 PROCEDURE — 99396 PREV VISIT EST AGE 40-64: CPT | Performed by: INTERNAL MEDICINE

## 2025-03-20 NOTE — PATIENT INSTRUCTIONS
"Patient Education     Routine physical for adults   The Basics   Written by the doctors and editors at [facility]   What is a physical? -- A physical is a routine visit, or \"check-up,\" with your doctor. You might also hear it called a \"wellness visit\" or \"preventive visit.\"  During each visit, the doctor will:   Ask about your physical and mental health   Ask about your habits, behaviors, and lifestyle   Do an exam   Give you vaccines if needed   Talk to you about any medicines you take   Give advice about your health   Answer your questions  Getting regular check-ups is an important part of taking care of your health. It can help your doctor find and treat any problems you have. But it's also important for preventing health problems.  A routine physical is different from a \"sick visit.\" A sick visit is when you see a doctor because of a health concern or problem. Since physicals are scheduled ahead of time, you can think about what you want to ask the doctor.  How often should I get a physical? -- It depends on your age and health. In general, for people age 21 years and older:   If you are younger than 50 years, you might be able to get a physical every 3 years.   If you are 50 years or older, your doctor might recommend a physical every year.  If you have an ongoing health condition, like diabetes or high blood pressure, your doctor will probably want to see you more often.  What happens during a physical? -- In general, each visit will include:   Physical exam - The doctor or nurse will check your height, weight, heart rate, and blood pressure. They will also look at your eyes and ears. They will ask about how you are feeling and whether you have any symptoms that bother you.   Medicines - It's a good idea to bring a list of all the medicines you take to each doctor visit. Your doctor will talk to you about your medicines and answer any questions. Tell them if you are having any side effects that bother you. You " "should also tell them if you are having trouble paying for any of your medicines.   Habits and behaviors - This includes:   Your diet   Your exercise habits   Whether you smoke, drink alcohol, or use drugs   Whether you are sexually active   Whether you feel safe at home  Your doctor will talk to you about things you can do to improve your health and lower your risk of health problems. They will also offer help and support. For example, if you want to quit smoking, they can give you advice and might prescribe medicines. If you want to improve your diet or get more physical activity, they can help you with this, too.   Lab tests, if needed - The tests you get will depend on your age and situation. For example, your doctor might want to check your:   Cholesterol   Blood sugar   Iron level   Vaccines - The recommended vaccines will depend on your age, health, and what vaccines you already had. Vaccines are very important because they can prevent certain serious or deadly infections.   Discussion of screening - \"Screening\" means checking for diseases or other health problems before they cause symptoms. Your doctor can recommend screening based on your age, risk, and preferences. This might include tests to check for:   Cancer, such as breast, prostate, cervical, ovarian, colorectal, prostate, lung, or skin cancer   Sexually transmitted infections, such as chlamydia and gonorrhea   Mental health conditions like depression and anxiety  Your doctor will talk to you about the different types of screening tests. They can help you decide which screenings to have. They can also explain what the results might mean.   Answering questions - The physical is a good time to ask the doctor or nurse questions about your health. If needed, they can refer you to other doctors or specialists, too.  Adults older than 65 years often need other care, too. As you get older, your doctor will talk to you about:   How to prevent falling at " home   Hearing or vision tests   Memory testing   How to take your medicines safely   Making sure that you have the help and support you need at home  All topics are updated as new evidence becomes available and our peer review process is complete.  This topic retrieved from [employer] on: May 02, 2024.  Topic 233171 Version 1.0  Release: 32.4.3 - C32.122  © 2024 UpToDate, Inc. and/or its affiliates. All rights reserved.  Consumer Information Use and Disclaimer   Disclaimer: This generalized information is a limited summary of diagnosis, treatment, and/or medication information. It is not meant to be comprehensive and should be used as a tool to help the user understand and/or assess potential diagnostic and treatment options. It does NOT include all information about conditions, treatments, medications, side effects, or risks that may apply to a specific patient. It is not intended to be medical advice or a substitute for the medical advice, diagnosis, or treatment of a health care provider based on the health care provider's examination and assessment of a patient's specific and unique circumstances. Patients must speak with a health care provider for complete information about their health, medical questions, and treatment options, including any risks or benefits regarding use of medications. This information does not endorse any treatments or medications as safe, effective, or approved for treating a specific patient. UpToDate, Inc. and its affiliates disclaim any warranty or liability relating to this information or the use thereof.The use of this information is governed by the Terms of Use, available at https://www.w[employer]tersMag[employer] Digitaluwer.com/en/know/clinical-effectiveness-terms. 2024© UpToDate, Inc. and its affiliates and/or licensors. All rights reserved.  Copyright   © 2024 UpToDate, Inc. and/or its affiliates. All rights reserved.

## 2025-03-20 NOTE — PROGRESS NOTES
Adult Annual Physical  Name: Татьяна Olivas      : 1975      MRN: [id number]  Encounter Provider: Zee Maddox MD  Encounter Date: 3/20/2025   Encounter department: [facility]    Assessment & Plan  Annual physical exam  Patient is up-to-date with colonoscopy and mammogram.  She is following agree with her gynecologist.       Alpha thalassemia minor    Orders:    CBC and differential    Iron Panel (Includes Ferritin, Iron Sat%, Iron, and TIBC)    Screening for thyroid disorder    Orders:    TSH, 3rd generation with Free T4 reflex    Screening for diabetes mellitus    Orders:    Hemoglobin A1C; Future    Comprehensive metabolic panel    Need for lipid screening    Orders:    Lipid panel    Screening for genitourinary condition    Orders:    Urinalysis with microscopic; Future    Family history of thyroid cancer    Orders:    US thyroid; Future    Mass of right side of neck    Orders:    US head neck soft tissue; Future    PAD (peripheral artery disease) (HCC)  Diffuse atherosclerotic disease as per arterial Dopplers .  She was seen by vascular and was recommended statin which patient had refused.  Recommended to have repeat ultrasound in a couple of years.  Will repeat the blood work before ordering further testing.  Orders:    Lipoprotein A (LPA); Future      Preventive Screenings:    - Hepatitis C screening: screening up-to-date   - HIV screening: screening up-to-date   - Breast cancer screening: screening up-to-date   - Colon cancer screening: screening up-to-date   - Lung cancer screening: screening not indicated     Immunizations:  - Immunizations due: Influenza, Prevnar 20 and Tdap       Patient is here for annual physical.  Her blood pressure is good.  She is due for lab studies.  Up-to-date with mammogram colonoscopy.  Does complain of ongoing fatigue.  Her mom is struggling with thyroid cancer.  He had an ultrasound couple of years ago which was unremarkable.  Repeat the  blood work.  She also has a mass in the right shoulder which is quite diffuse and is hardly palpable however patient is quite concerned and I will order an ultrasound.  History of Present Illness     Adult Annual Physical:  Patient presents for annual physical.     Diet and Physical Activity:  - Diet/Nutrition: well balanced diet.  - Exercise: walking.    Depression Screening:  - PHQ-2 Score: 0    General Health:  - Sleep: 7-8 hours of sleep on average.  - Hearing: normal hearing bilateral ears.  - Vision: no vision problems.  - Dental: brushes teeth twice daily.    /GYN Health:  - Follows with GYN: yes.     Review of Systems  Past Medical History   Past Medical History:   Diagnosis Date    Alpha thalassemia 2     Anemia     Chronic fatigue     last assessed: 2017    Depression     last assessed: 2015    Esophageal reflux     Globus sensation     last assessed: 2016    Hypochromic anemia     Microcytic thalassemia    Leukocytosis     last assessed: 2/3/2017    Palpitations     resolved: 2017    Thalassemia     Trouble swallowing     Vitamin D deficiency      Past Surgical History:   Procedure Laterality Date     SECTION      COLONOSCOPY      MD COLONOSCOPY FLX DX W/COLLJ SPEC WHEN PFRMD N/A 2016    Procedure: EGD AND COLONOSCOPY;  Surgeon: Yanet Encarnacion DO;  Location: [facility] GI LAB;  Service: Gastroenterology    UPPER GASTROINTESTINAL ENDOSCOPY       Family History   Problem Relation Age of Onset    Diabetes Family         mellitus    Kidney cancer Family     Diabetes Mother         mellitus    Thyroid cancer Mother     Kidney cancer Father         primary, with metastasis from kidney to other site    Stroke Father       reports that she has quit smoking. She quit smokeless tobacco use about 25 years ago. She reports that she does not drink alcohol and does not use drugs.  Current Outpatient Medications   Medication Instructions    Carboxymeth-Glyc-Polysorb PF (Refresh Optive  "Gigi-3) 0.5-1-0.5 % SOLN As needed     Allergies   Allergen Reactions    Famotidine Diarrhea     Category: Allergy;     Zoloft [Sertraline Hcl]       Current Outpatient Medications on File Prior to Visit   Medication Sig Dispense Refill    Carboxymeth-Glyc-Polysorb PF (Refresh Optive Gigi-3) 0.5-1-0.5 % SOLN Apply to eye if needed      [DISCONTINUED] cholecalciferol (VITAMIN D3) 1,000 units tablet Take 1,000 Units by mouth daily (Patient not taking: Reported on 9/15/2022)      [DISCONTINUED] loteprednol etabonate (LOTEMAX) 0.5 % ophthalmic suspension  (Patient not taking: Reported on 3/20/2025)      [DISCONTINUED] Multiple Vitamins-Minerals (MULTIVITAMIN ADULT EXTRA C PO) Take by mouth (Patient not taking: Reported on 3/20/2025)      [DISCONTINUED] multivitamin (THERAGRAN) TABS Take 1 tablet by mouth daily (Patient not taking: Reported on 10/10/2023)      [DISCONTINUED] olopatadine HCl (PATADAY) 0.2 % opth drops Apply 1 drop to eye daily (Patient not taking: Reported on 3/20/2025)      [DISCONTINUED] Sutab [id number] MG TABS USE AS DIRECTED BIN: [id number] / PCN: [id number] / GROUP: [id number] / MEMBER ID: [id number] (Patient not taking: Reported on 3/20/2025)       No current facility-administered medications on file prior to visit.      Social History     Tobacco Use    Smoking status: Former    Smokeless tobacco: Former     Quit date: 9/27/1999    Tobacco comments:     never smoker ( as per allscripts)   Vaping Use    Vaping status: Never Used   Substance and Sexual Activity    Alcohol use: No    Drug use: No    Sexual activity: Yes     Partners: Male       Objective   BP 98/64 (BP Location: Left arm, Patient Position: Sitting, Cuff Size: Standard)   Pulse 64   Temp 97.6 °F (36.4 °C) (Temporal)   Ht 5' 2\" (1.575 m)   Wt 55.8 kg (123 lb)   SpO2 98%   BMI 22.50 kg/m²     Physical Exam  Constitutional:       General: She is not in acute distress.     Appearance: She is well-developed.   HENT:      Head: Normocephalic. "      Mouth/Throat:      Pharynx: No oropharyngeal exudate.   Eyes:      General: No scleral icterus.     Conjunctiva/sclera: Conjunctivae normal.   Neck:      Thyroid: No thyromegaly.      Vascular: No carotid bruit.      Comments: Mild fullness in the right trapezius area  Cardiovascular:      Rate and Rhythm: Normal rate and regular rhythm.      Heart sounds: Normal heart sounds. No murmur heard.  Pulmonary:      Effort: Pulmonary effort is normal. No respiratory distress.      Breath sounds: Normal breath sounds. No wheezing or rales.   Abdominal:      General: Bowel sounds are normal. There is no distension.      Palpations: Abdomen is soft. There is no mass.      Tenderness: There is no abdominal tenderness. There is no guarding or rebound.   Musculoskeletal:      Right lower leg: No edema.      Left lower leg: No edema.   Lymphadenopathy:      Cervical: No cervical adenopathy.   Skin:     General: Skin is warm.      Comments: Varicose veins mild   Neurological:      Mental Status: She is alert and oriented to person, place, and time.      Cranial Nerves: No cranial nerve deficit.      Deep Tendon Reflexes: Reflexes are normal and symmetric.   Psychiatric:         Mood and Affect: Mood normal.         Behavior: Behavior normal.         Thought Content: Thought content normal.         Judgment: Judgment normal.

## 2025-04-02 ENCOUNTER — HOSPITAL ENCOUNTER (OUTPATIENT)
Dept: ULTRASOUND IMAGING | Facility: MEDICAL CENTER | Age: 50
Discharge: HOME | End: 2025-04-02
Payer: COMMERCIAL

## 2025-04-02 DIAGNOSIS — Z80.8 FAMILY HISTORY OF THYROID CANCER: ICD-10-CM

## 2025-04-02 DIAGNOSIS — R22.1 MASS OF RIGHT SIDE OF NECK: ICD-10-CM

## 2025-04-02 PROCEDURE — 76536 US EXAM OF HEAD AND NECK: CPT

## 2025-04-03 ENCOUNTER — APPOINTMENT (OUTPATIENT)
Dept: LAB | Facility: MEDICAL CENTER | Age: 50
End: 2025-04-03
Payer: COMMERCIAL

## 2025-04-03 DIAGNOSIS — I73.9 PAD (PERIPHERAL ARTERY DISEASE) (HCC): ICD-10-CM

## 2025-04-03 DIAGNOSIS — Z13.1 SCREENING FOR DIABETES MELLITUS: ICD-10-CM

## 2025-04-03 DIAGNOSIS — Z13.89 SCREENING FOR GENITOURINARY CONDITION: ICD-10-CM

## 2025-04-03 LAB
25(OH)D3 SERPL-MCNC: 28.1 NG/ML (ref 30–100)
ALBUMIN SERPL BCG-MCNC: 5.1 G/DL (ref 3.5–5)
ALP SERPL-CCNC: 63 U/L (ref 34–104)
ALT SERPL W P-5'-P-CCNC: 13 U/L (ref 7–52)
ANION GAP SERPL CALCULATED.3IONS-SCNC: 8 MMOL/L (ref 4–13)
AST SERPL W P-5'-P-CCNC: 15 U/L (ref 13–39)
BACTERIA UR QL AUTO: ABNORMAL /HPF
BASOPHILS # BLD AUTO: 0.13 THOUSANDS/ÂΜL (ref 0–0.1)
BASOPHILS NFR BLD AUTO: 1 % (ref 0–1)
BILIRUB SERPL-MCNC: 1.16 MG/DL (ref 0.2–1)
BILIRUB UR QL STRIP: NEGATIVE
BUN SERPL-MCNC: 16 MG/DL (ref 5–25)
CALCIUM SERPL-MCNC: 9.7 MG/DL (ref 8.4–10.2)
CHLORIDE SERPL-SCNC: 103 MMOL/L (ref 96–108)
CHOLEST SERPL-MCNC: 202 MG/DL (ref ?–200)
CLARITY UR: CLEAR
CO2 SERPL-SCNC: 30 MMOL/L (ref 21–32)
COLOR UR: ABNORMAL
CREAT SERPL-MCNC: 0.77 MG/DL (ref 0.6–1.3)
EOSINOPHIL # BLD AUTO: 0.42 THOUSAND/ÂΜL (ref 0–0.61)
EOSINOPHIL NFR BLD AUTO: 4 % (ref 0–6)
ERYTHROCYTE [DISTWIDTH] IN BLOOD BY AUTOMATED COUNT: 17.9 % (ref 11.6–15.1)
EST. AVERAGE GLUCOSE BLD GHB EST-MCNC: 120 MG/DL
FERRITIN SERPL-MCNC: 91 NG/ML (ref 11–307)
GFR SERPL CREATININE-BSD FRML MDRD: 91 ML/MIN/1.73SQ M
GLUCOSE SERPL-MCNC: 83 MG/DL (ref 65–140)
GLUCOSE UR STRIP-MCNC: NEGATIVE MG/DL
HBA1C MFR BLD: 5.8 %
HCT VFR BLD AUTO: 40.8 % (ref 34.8–46.1)
HDLC SERPL-MCNC: 73 MG/DL
HGB BLD-MCNC: 12.7 G/DL (ref 11.5–15.4)
HGB UR QL STRIP.AUTO: NEGATIVE
IMM GRANULOCYTES # BLD AUTO: 0.03 THOUSAND/UL (ref 0–0.2)
IMM GRANULOCYTES NFR BLD AUTO: 0 % (ref 0–2)
IRON SATN MFR SERPL: 33 % (ref 15–50)
IRON SERPL-MCNC: 140 UG/DL (ref 50–212)
KETONES UR STRIP-MCNC: NEGATIVE MG/DL
LDLC SERPL CALC-MCNC: 116 MG/DL (ref 0–100)
LEUKOCYTE ESTERASE UR QL STRIP: NEGATIVE
LYMPHOCYTES # BLD AUTO: 2.74 THOUSANDS/ÂΜL (ref 0.6–4.47)
LYMPHOCYTES NFR BLD AUTO: 28 % (ref 14–44)
MCH RBC QN AUTO: 20 PG (ref 26.8–34.3)
MCHC RBC AUTO-ENTMCNC: 31.1 G/DL (ref 31.4–37.4)
MCV RBC AUTO: 64 FL (ref 82–98)
MONOCYTES # BLD AUTO: 0.67 THOUSAND/ÂΜL (ref 0.17–1.22)
MONOCYTES NFR BLD AUTO: 7 % (ref 4–12)
MUCOUS THREADS UR QL AUTO: ABNORMAL
NEUTROPHILS # BLD AUTO: 5.86 THOUSANDS/ÂΜL (ref 1.85–7.62)
NEUTS SEG NFR BLD AUTO: 60 % (ref 43–75)
NITRITE UR QL STRIP: NEGATIVE
NON-SQ EPI CELLS URNS QL MICRO: ABNORMAL /HPF
NONHDLC SERPL-MCNC: 129 MG/DL
NRBC BLD AUTO-RTO: 0 /100 WBCS
PH UR STRIP.AUTO: 6 [PH]
PLATELET # BLD AUTO: 291 THOUSANDS/UL (ref 149–390)
POTASSIUM SERPL-SCNC: 4 MMOL/L (ref 3.5–5.3)
PROT SERPL-MCNC: 7.6 G/DL (ref 6.4–8.4)
PROT UR STRIP-MCNC: NEGATIVE MG/DL
RBC # BLD AUTO: 6.36 MILLION/UL (ref 3.81–5.12)
RBC #/AREA URNS AUTO: ABNORMAL /HPF
SODIUM SERPL-SCNC: 141 MMOL/L (ref 135–147)
SP GR UR STRIP.AUTO: 1.02 (ref 1–1.03)
TIBC SERPL-MCNC: 422.8 UG/DL (ref 250–450)
TRANSFERRIN SERPL-MCNC: 302 MG/DL (ref 203–362)
TRIGL SERPL-MCNC: 65 MG/DL (ref ?–150)
TSH SERPL DL<=0.05 MIU/L-ACNC: 1.67 UIU/ML (ref 0.45–4.5)
UIBC SERPL-MCNC: 283 UG/DL (ref 155–355)
UROBILINOGEN UR STRIP-ACNC: <2 MG/DL
WBC # BLD AUTO: 9.85 THOUSAND/UL (ref 4.31–10.16)
WBC #/AREA URNS AUTO: ABNORMAL /HPF

## 2025-04-03 PROCEDURE — 83695 ASSAY OF LIPOPROTEIN(A): CPT

## 2025-04-03 PROCEDURE — 83036 HEMOGLOBIN GLYCOSYLATED A1C: CPT

## 2025-04-03 PROCEDURE — 81001 URINALYSIS AUTO W/SCOPE: CPT

## 2025-04-04 ENCOUNTER — RESULTS FOLLOW-UP (OUTPATIENT)
Age: 50
End: 2025-04-04

## 2025-04-07 LAB — LPA SERPL-SCNC: 130.3 NMOL/L

## 2025-04-09 ENCOUNTER — TELEMEDICINE (OUTPATIENT)
Age: 50
End: 2025-04-09
Payer: COMMERCIAL

## 2025-04-09 ENCOUNTER — TELEPHONE (OUTPATIENT)
Age: 50
End: 2025-04-09

## 2025-04-09 VITALS — BODY MASS INDEX: 22.66 KG/M2 | WEIGHT: 120 LBS | HEIGHT: 61 IN

## 2025-04-09 DIAGNOSIS — R73.09 ELEVATED HEMOGLOBIN A1C: ICD-10-CM

## 2025-04-09 DIAGNOSIS — R17 ELEVATED BILIRUBIN: ICD-10-CM

## 2025-04-09 DIAGNOSIS — I73.9 PAD (PERIPHERAL ARTERY DISEASE) (HCC): ICD-10-CM

## 2025-04-09 DIAGNOSIS — E78.41 ELEVATED LIPOPROTEIN(A): Primary | ICD-10-CM

## 2025-04-09 PROCEDURE — 99214 OFFICE O/P EST MOD 30 MIN: CPT | Performed by: INTERNAL MEDICINE

## 2025-04-09 NOTE — TELEPHONE ENCOUNTER
Patient called asking where she needs to go to have CT coronary calcium score and Arterial Duplex done.  Advised that she needs to schedule these tests through Central Scheduling and provided with that phone number.  Advised to have UGT1A1 lab done at [facility] at her earliest convenience and Lipid Panel drawn in 6 weeks.  
Afib

## 2025-04-09 NOTE — PROGRESS NOTES
"Virtual Regular VisitName: Татьяна Olivas      : 1975      MRN: [id number]  Encounter Provider: Zee Maddox MD  Encounter Date: 2025   Encounter department: [facility]  :  Assessment & Plan  Elevated lipoprotein(a)    Orders:    CT coronary calcium score; Future    Lipid panel    PAD (peripheral artery disease) (HCC)    Orders:    CT coronary calcium score; Future    VAS ARTERIAL DUPLEX- LOWER LIMB BILATERAL; Future    Elevated bilirubin    Orders:    UGT1A1 Gene Polymorphism (TA Repeat); Future    Elevated hemoglobin A1c             History of Present Illness     HPI  Patient is being seen to televisit to discuss recent labs.  Patient has long history of dyslipidemia.  Lipoprotein was also ordered which is increased to 130.  Her father had CVA in the age of 50.  I strongly recommended her to start statins however patient wants to manage 90 modification.  I suggested repeating the blood work in 6 weeks.  Definitely getting a CT CAC at this time.  Patient is known to have diffuse dysplastic disease on the PAD testing done .  Will repeat the arterial Dopplers as well as CT CAC.  Labs in 6 weeks.    Unfortunately, hemoglobin A1c is also increased.  Fasting glucose level was good.  BMI is perfect.  Diet modification will be suffice.   Lastly, her total bili was elevated.  I suspect  Gilbert's syndrome.  I would check UGT1A!  Review of Systems    Objective   Ht 5' 1\" (1.549 m)   Wt 54.4 kg (120 lb)   BMI 22.67 kg/m²     Physical Exam    Administrative Statements   Encounter provider Zee Maddox MD    The Patient is located at Home and in the following state in which I hold an active license [place].    The patient was identified by name and date of birth. Татьяна Olivas was informed that this is a telemedicine visit and that the visit is being conducted through the Epic Embedded platform. She agrees to proceed..  My office door was closed. No one else was in the room.  She " acknowledged consent and understanding of privacy and security of the video platform. The patient has agreed to participate and understands they can discontinue the visit at any time.    I have spent a total time of 25 minutes in caring for this patient on the day of the visit/encounter including Diagnostic results, Prognosis, Risks and benefits of tx options, Instructions for management, Patient and family education, Importance of tx compliance, Risk factor reductions, Impressions, Counseling / Coordination of care, Documenting in the medical record, Reviewing/placing orders in the medical record (including tests, medications, and/or procedures), and Obtaining or reviewing history  , not including the time spent for establishing the audio/video connection.

## 2025-04-09 NOTE — ASSESSMENT & PLAN NOTE
Orders:    CT coronary calcium score; Future    VAS ARTERIAL DUPLEX- LOWER LIMB BILATERAL; Future

## 2025-04-11 ENCOUNTER — HOSPITAL ENCOUNTER (OUTPATIENT)
Dept: CT IMAGING | Facility: HOSPITAL | Age: 50
Discharge: HOME | End: 2025-04-11
Payer: COMMERCIAL

## 2025-04-11 DIAGNOSIS — I73.9 PAD (PERIPHERAL ARTERY DISEASE) (HCC): ICD-10-CM

## 2025-04-11 DIAGNOSIS — E78.41 ELEVATED LIPOPROTEIN(A): ICD-10-CM

## 2025-04-11 PROCEDURE — 75571 CT HRT W/O DYE W/CA TEST: CPT

## 2025-04-14 ENCOUNTER — APPOINTMENT (OUTPATIENT)
Dept: LAB | Facility: MEDICAL CENTER | Age: 50
End: 2025-04-14

## 2025-04-14 DIAGNOSIS — R17 ELEVATED BILIRUBIN: ICD-10-CM

## 2025-04-15 ENCOUNTER — RESULTS FOLLOW-UP (OUTPATIENT)
Age: 50
End: 2025-04-15

## 2025-04-27 LAB — MISCELLANEOUS LAB TEST RESULT: NORMAL

## 2025-05-07 ENCOUNTER — RESULTS FOLLOW-UP (OUTPATIENT)
Age: 50
End: 2025-05-07

## 2025-05-07 DIAGNOSIS — K76.89 HEPATIC CYST: Primary | ICD-10-CM

## 2025-05-07 NOTE — TELEPHONE ENCOUNTER
Patient called in and has questions about her CT scan. Patient is requestinga call back regarding this.    [phone].    Thank you

## 2025-05-07 NOTE — TELEPHONE ENCOUNTER
Patient returned call back. Advised patient per notes in chart Mammogram is good. Would encourage her to follow-up with a gynecologist for consideration of ultrasound if need be.    Call ended well.

## 2025-05-07 NOTE — TELEPHONE ENCOUNTER
----- Message from Zee Maddox MD sent at 5/7/2025  9:12 AM EDT -----  Mammogram is good.  Would encourage her to follow-up with a gynecologist for consideration of ultrasound if need be

## 2025-05-21 ENCOUNTER — APPOINTMENT (OUTPATIENT)
Dept: LAB | Facility: MEDICAL CENTER | Age: 50
End: 2025-05-21
Payer: COMMERCIAL

## 2025-05-21 LAB
CHOLEST SERPL-MCNC: 180 MG/DL (ref ?–200)
HDLC SERPL-MCNC: 58 MG/DL
LDLC SERPL CALC-MCNC: 105 MG/DL (ref 0–100)
NONHDLC SERPL-MCNC: 122 MG/DL
TRIGL SERPL-MCNC: 85 MG/DL (ref ?–150)

## 2025-05-29 ENCOUNTER — HOSPITAL ENCOUNTER (OUTPATIENT)
Dept: ULTRASOUND IMAGING | Facility: HOSPITAL | Age: 50
Discharge: HOME | End: 2025-05-29
Payer: COMMERCIAL

## 2025-05-29 DIAGNOSIS — K76.89 HEPATIC CYST: ICD-10-CM

## 2025-05-29 PROCEDURE — 76700 US EXAM ABDOM COMPLETE: CPT

## 2025-06-04 ENCOUNTER — HOSPITAL ENCOUNTER (OUTPATIENT)
Dept: NON INVASIVE DIAGNOSTICS | Facility: HOSPITAL | Age: 50
Discharge: HOME | End: 2025-06-04
Attending: INTERNAL MEDICINE
Payer: COMMERCIAL

## 2025-06-04 DIAGNOSIS — I73.9 PAD (PERIPHERAL ARTERY DISEASE) (HCC): ICD-10-CM

## 2025-06-04 PROCEDURE — 93925 LOWER EXTREMITY STUDY: CPT

## 2025-06-04 PROCEDURE — 93923 UPR/LXTR ART STDY 3+ LVLS: CPT

## 2025-06-04 PROCEDURE — 93925 LOWER EXTREMITY STUDY: CPT | Performed by: SURGERY

## 2025-06-04 PROCEDURE — 93922 UPR/L XTREMITY ART 2 LEVELS: CPT | Performed by: SURGERY

## 2025-06-05 ENCOUNTER — RESULTS FOLLOW-UP (OUTPATIENT)
Age: 50
End: 2025-06-05

## 2025-06-06 NOTE — TELEPHONE ENCOUNTER
----- Message from Zee Maddox MD sent at 6/5/2025  8:12 PM EDT -----  Doppler is good  ----- Message -----  From: Interface, Radiology Results In  Sent: 6/4/2025   5:19 PM EDT  To: Zee Maddox MD

## 2025-07-28 ENCOUNTER — OFFICE VISIT (OUTPATIENT)
Age: 50
End: 2025-07-28
Payer: COMMERCIAL

## 2025-07-28 ENCOUNTER — APPOINTMENT (OUTPATIENT)
Age: 50
End: 2025-07-28
Payer: COMMERCIAL

## 2025-07-28 VITALS
BODY MASS INDEX: 22.28 KG/M2 | TEMPERATURE: 98.3 F | DIASTOLIC BLOOD PRESSURE: 70 MMHG | HEIGHT: 61 IN | WEIGHT: 118 LBS | OXYGEN SATURATION: 98 % | SYSTOLIC BLOOD PRESSURE: 112 MMHG | HEART RATE: 68 BPM | RESPIRATION RATE: 16 BRPM

## 2025-07-28 DIAGNOSIS — R10.13 EPIGASTRIC PAIN: ICD-10-CM

## 2025-07-28 DIAGNOSIS — D17.1 LIPOMA OF BACK: ICD-10-CM

## 2025-07-28 DIAGNOSIS — R10.13 EPIGASTRIC PAIN: Primary | ICD-10-CM

## 2025-07-28 LAB
ALBUMIN SERPL BCG-MCNC: 4.9 G/DL (ref 3.5–5)
ALP SERPL-CCNC: 58 U/L (ref 34–104)
ALT SERPL W P-5'-P-CCNC: 11 U/L (ref 7–52)
AMYLASE SERPL-CCNC: 45 IU/L (ref 29–103)
ANION GAP SERPL CALCULATED.3IONS-SCNC: 10 MMOL/L (ref 4–13)
AST SERPL W P-5'-P-CCNC: 16 U/L (ref 13–39)
BASOPHILS # BLD AUTO: 0.14 THOUSANDS/ÂΜL (ref 0–0.1)
BASOPHILS NFR BLD AUTO: 2 % (ref 0–1)
BILIRUB SERPL-MCNC: 1.13 MG/DL (ref 0.2–1)
BUN SERPL-MCNC: 16 MG/DL (ref 5–25)
CALCIUM SERPL-MCNC: 10 MG/DL (ref 8.4–10.2)
CHLORIDE SERPL-SCNC: 102 MMOL/L (ref 96–108)
CO2 SERPL-SCNC: 27 MMOL/L (ref 21–32)
CREAT SERPL-MCNC: 0.75 MG/DL (ref 0.6–1.3)
EOSINOPHIL # BLD AUTO: 0.25 THOUSAND/ÂΜL (ref 0–0.61)
EOSINOPHIL NFR BLD AUTO: 3 % (ref 0–6)
ERYTHROCYTE [DISTWIDTH] IN BLOOD BY AUTOMATED COUNT: 18.3 % (ref 11.6–15.1)
GFR SERPL CREATININE-BSD FRML MDRD: 93 ML/MIN/1.73SQ M
GLUCOSE SERPL-MCNC: 87 MG/DL (ref 65–140)
HCT VFR BLD AUTO: 40.3 % (ref 34.8–46.1)
HGB BLD-MCNC: 12.5 G/DL (ref 11.5–15.4)
IMM GRANULOCYTES # BLD AUTO: 0.03 THOUSAND/UL (ref 0–0.2)
IMM GRANULOCYTES NFR BLD AUTO: 0 % (ref 0–2)
LIPASE SERPL-CCNC: 24 U/L (ref 11–82)
LYMPHOCYTES # BLD AUTO: 2.17 THOUSANDS/ÂΜL (ref 0.6–4.47)
LYMPHOCYTES NFR BLD AUTO: 25 % (ref 14–44)
MCH RBC QN AUTO: 20 PG (ref 26.8–34.3)
MCHC RBC AUTO-ENTMCNC: 31 G/DL (ref 31.4–37.4)
MCV RBC AUTO: 65 FL (ref 82–98)
MONOCYTES # BLD AUTO: 0.64 THOUSAND/ÂΜL (ref 0.17–1.22)
MONOCYTES NFR BLD AUTO: 7 % (ref 4–12)
NEUTROPHILS # BLD AUTO: 5.55 THOUSANDS/ÂΜL (ref 1.85–7.62)
NEUTS SEG NFR BLD AUTO: 63 % (ref 43–75)
NRBC BLD AUTO-RTO: 0 /100 WBCS
PLATELET # BLD AUTO: 330 THOUSANDS/UL (ref 149–390)
POTASSIUM SERPL-SCNC: 4.3 MMOL/L (ref 3.5–5.3)
PROT SERPL-MCNC: 7.5 G/DL (ref 6.4–8.4)
RBC # BLD AUTO: 6.24 MILLION/UL (ref 3.81–5.12)
SODIUM SERPL-SCNC: 139 MMOL/L (ref 135–147)
WBC # BLD AUTO: 8.78 THOUSAND/UL (ref 4.31–10.16)

## 2025-07-28 PROCEDURE — 36415 COLL VENOUS BLD VENIPUNCTURE: CPT

## 2025-07-28 PROCEDURE — 83690 ASSAY OF LIPASE: CPT

## 2025-07-28 PROCEDURE — 80053 COMPREHEN METABOLIC PANEL: CPT | Performed by: INTERNAL MEDICINE

## 2025-07-28 PROCEDURE — 82150 ASSAY OF AMYLASE: CPT

## 2025-07-28 PROCEDURE — 85025 COMPLETE CBC W/AUTO DIFF WBC: CPT | Performed by: INTERNAL MEDICINE

## 2025-07-28 PROCEDURE — 99214 OFFICE O/P EST MOD 30 MIN: CPT | Performed by: INTERNAL MEDICINE

## 2025-08-19 ENCOUNTER — HOSPITAL ENCOUNTER (OUTPATIENT)
Dept: MRI IMAGING | Facility: HOSPITAL | Age: 50
Discharge: HOME | End: 2025-08-19
Attending: INTERNAL MEDICINE
Payer: COMMERCIAL

## 2025-08-19 DIAGNOSIS — R10.13 EPIGASTRIC PAIN: ICD-10-CM

## 2025-08-19 DIAGNOSIS — D17.1 LIPOMA OF BACK: ICD-10-CM

## 2025-08-19 PROCEDURE — 74181 MRI ABDOMEN W/O CONTRAST: CPT

## 2025-08-19 RX ORDER — GADOBUTROL 604.72 MG/ML
5 INJECTION INTRAVENOUS
Status: DISCONTINUED | OUTPATIENT
Start: 2025-08-19 | End: 2025-08-19